# Patient Record
Sex: MALE | Race: BLACK OR AFRICAN AMERICAN | Employment: FULL TIME | URBAN - METROPOLITAN AREA
[De-identification: names, ages, dates, MRNs, and addresses within clinical notes are randomized per-mention and may not be internally consistent; named-entity substitution may affect disease eponyms.]

---

## 2017-04-30 DIAGNOSIS — M10.9 GOUT: ICD-10-CM

## 2017-05-01 RX ORDER — ALLOPURINOL 100 MG/1
TABLET ORAL
Qty: 60 TAB | Refills: 0 | Status: SHIPPED | OUTPATIENT
Start: 2017-05-01 | End: 2017-06-17 | Stop reason: SDUPTHER

## 2017-05-17 ENCOUNTER — OFFICE VISIT (OUTPATIENT)
Dept: INTERNAL MEDICINE CLINIC | Age: 42
End: 2017-05-17

## 2017-05-17 VITALS
BODY MASS INDEX: 39.88 KG/M2 | RESPIRATION RATE: 20 BRPM | OXYGEN SATURATION: 95 % | SYSTOLIC BLOOD PRESSURE: 133 MMHG | HEIGHT: 70 IN | WEIGHT: 278.6 LBS | HEART RATE: 74 BPM | TEMPERATURE: 98.3 F | DIASTOLIC BLOOD PRESSURE: 83 MMHG

## 2017-05-17 DIAGNOSIS — M1A.9XX0 CHRONIC GOUT INVOLVING TOE OF RIGHT FOOT WITHOUT TOPHUS, UNSPECIFIED CAUSE: ICD-10-CM

## 2017-05-17 DIAGNOSIS — R30.0 DYSURIA: Primary | ICD-10-CM

## 2017-05-17 DIAGNOSIS — R82.90 ABNORMAL FINDING IN URINE: ICD-10-CM

## 2017-05-17 DIAGNOSIS — R35.0 URINARY FREQUENCY: ICD-10-CM

## 2017-05-17 DIAGNOSIS — E78.00 PURE HYPERCHOLESTEROLEMIA: ICD-10-CM

## 2017-05-17 LAB
BILIRUB UR QL STRIP: NEGATIVE
GLUCOSE UR-MCNC: NEGATIVE MG/DL
HBA1C MFR BLD HPLC: 5.7 % (ref 4.8–5.6)
KETONES P FAST UR STRIP-MCNC: NEGATIVE MG/DL
PH UR STRIP: 6.5 [PH] (ref 4.6–8)
PROT UR QL STRIP: NEGATIVE MG/DL
SP GR UR STRIP: 1.01 (ref 1–1.03)
UA UROBILINOGEN AMB POC: NORMAL (ref 0.2–1)
URINALYSIS CLARITY POC: CLEAR
URINALYSIS COLOR POC: YELLOW
URINE BLOOD POC: NORMAL
URINE LEUKOCYTES POC: NORMAL
URINE NITRITES POC: NEGATIVE

## 2017-05-17 RX ORDER — CIPROFLOXACIN 500 MG/1
500 TABLET ORAL 2 TIMES DAILY
Qty: 28 TAB | Refills: 0 | Status: SHIPPED | OUTPATIENT
Start: 2017-05-17 | End: 2017-05-31

## 2017-05-17 RX ORDER — LOVASTATIN 20 MG/1
20 TABLET ORAL
Qty: 90 TAB | Refills: 3 | Status: SHIPPED | OUTPATIENT
Start: 2017-05-17 | End: 2017-10-24

## 2017-05-17 NOTE — PROGRESS NOTES
RM#7\  Chief Complaint   Patient presents with    Urinary Burning     x 2 weeks     Results for orders placed or performed in visit on 05/17/17   AMB POC URINALYSIS DIP STICK AUTO W/O MICRO   Result Value Ref Range    Color (UA POC) Yellow     Clarity (UA POC) Clear     Glucose (UA POC) Negative Negative    Bilirubin (UA POC) Negative Negative    Ketones (UA POC) Negative Negative    Specific gravity (UA POC) 1.015 1.001 - 1.035    Blood (UA POC) Trace Negative    pH (UA POC) 6.5 4.6 - 8.0    Protein (UA POC) Negative Negative mg/dL    Urobilinogen (UA POC) 0.2 mg/dL 0.2 - 1    Nitrites (UA POC) Negative Negative    Leukocyte esterase (UA POC) 1+ Negative     Results for orders placed or performed in visit on 05/17/17   AMB POC URINALYSIS DIP STICK AUTO W/O MICRO   Result Value Ref Range    Color (UA POC) Yellow     Clarity (UA POC) Clear     Glucose (UA POC) Negative Negative    Bilirubin (UA POC) Negative Negative    Ketones (UA POC) Negative Negative    Specific gravity (UA POC) 1.015 1.001 - 1.035    Blood (UA POC) Trace Negative    pH (UA POC) 6.5 4.6 - 8.0    Protein (UA POC) Negative Negative mg/dL    Urobilinogen (UA POC) 0.2 mg/dL 0.2 - 1    Nitrites (UA POC) Negative Negative    Leukocyte esterase (UA POC) 1+ Negative   AMB POC HEMOGLOBIN A1C   Result Value Ref Range    Hemoglobin A1c (POC) 5.7 (A) 4.8 - 5.6 %         1. Have you been to the ER, urgent care clinic since your last visit? Hospitalized since your last visit? Yes    2. Have you seen or consulted any other health care providers outside of the [facility] since your last visit? Include any pap smears or colon screening.  Yes, [facility] ER six months ago for chest pains

## 2017-05-17 NOTE — MR AVS SNAPSHOT
Visit Information Date & Time Provider Department Dept. Phone Encounter #  
 5/17/2017  9:00 AM Rachel Lucianoa [phone] Pediatrics and Internal Medicine [phone] [id number] Follow-up Instructions Return in about 2 months (around 7/17/2017) for fasting labs. Upcoming Health Maintenance Date Due DTaP/Tdap/Td series (1 - Tdap) 4/23/1994 INFLUENZA AGE 9 TO ADULT 8/1/2017 Allergies as of 5/17/2017  Review Complete On: 5/17/2017 By: Saul Salazar NP No Known Allergies Current Immunizations  Never Reviewed No immunizations on file. Not reviewed this visit You Were Diagnosed With   
  
 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 9XX0 
ICD-9-CM: 274.02 Abnormal finding in urine     ICD-10-CM: R82.90 ICD-9-CM: 791.9 Vitals BP Pulse Temp Resp Height(growth percentile) Weight(growth percentile) 133/83 (BP 1 Location: Right arm, BP Patient Position: Sitting) 74 98.3 °F (36.8 °C) (Oral) 20 5' 10\" (1.778 m) 278 lb 9.6 oz (126.4 kg) SpO2 BMI Smoking Status 95% 39.97 kg/m2 Never Smoker BMI and BSA Data Body Mass Index Body Surface Area  
 39.97 kg/m 2 2.5 m 2 Preferred Pharmacy Pharmacy Name Phone [facility] [address] [place] [address] 78 Your Updated Medication List  
  
   
This list is accurate as of: 5/17/17  9:33 AM.  Always use your most recent med list.  
  
  
  
  
 allopurinol 100 mg tablet Commonly known as:  ZYLOPRIM  
TAKE TWO TABLETS BY MOUTH ONCE DAILY  
  
 ciprofloxacin HCl 500 mg tablet Commonly known as:  CIPRO Take 1 Tab by mouth two (2) times a day for 14 days. lovastatin 20 mg tablet Commonly known as:  MEVACOR Take 1 Tab by mouth nightly. Prescriptions Sent to Pharmacy Refills  
 lovastatin (MEVACOR) 20 mg tablet 3 Sig: Take 1 Tab by mouth nightly. Class: Normal  
 Pharmacy: [facility] [address], [place] [address] Ph #: [phone] Route: Oral  
 ciprofloxacin HCl (CIPRO) 500 mg tablet 0 Sig: Take 1 Tab by mouth two (2) times a day for 14 days. Class: Normal  
 Pharmacy: [facility] [address], [place] [address] Ph #: [phone] Route: Oral  
  
We Performed the Following AMB POC HEMOGLOBIN A1C [03596 CPT(R)] AMB POC URINALYSIS DIP STICK AUTO W/O MICRO [41011 CPT(R)] Chris Lanes / GC-AMPLIFIED [[id number] Custom] CULTURE, URINE B0413447 CPT(R)] Follow-up Instructions Return in about 2 months (around 7/17/2017) for fasting labs. Introducing [facility] & [facility]! Rebekah Corado introduces Backupify patient portal. Now you can access parts of your medical record, email your doctor's office, and request medication refills online. 1. In your internet browser, go to [email or web address] 2. Click on the First Time User? Click Here link in the Sign In box. You will see the New Member Sign Up page. 3. Enter your Backupify Access Code exactly as it appears below. You will not need to use this code after youve completed the sign-up process. If you do not sign up before the expiration date, you must request a new code. · Backupify Access Code: [email or web address] Expires: 8/15/2017  9:33 AM 
 
4. Enter the last four digits of your Social Security Number (xxxx) and Date of Birth (mm/dd/yyyy) as indicated and click Submit. You will be taken to the next sign-up page. 5. Create a Backupify ID. This will be your Backupify login ID and cannot be changed, so think of one that is secure and easy to remember. 6. Create a RedLasso password. You can change your password at any time. 7. Enter your Password Reset Question and Answer. This can be used at a later time if you forget your password. 8. Enter your e-mail address. You will receive e-mail notification when new information is available in [address] 9. Click Sign Up. You can now view and download portions of your medical record. 10. Click the Download Summary menu link to download a portable copy of your medical information. If you have questions, please visit the Frequently Asked Questions section of the RedLasso website. Remember, RedLasso is NOT to be used for urgent needs. For medical emergencies, dial 911. Now available from your iPhone and Android! Please provide this summary of care documentation to your next provider. Your primary care clinician is listed as Farhad Youngblood. If you have any questions after today's visit, please call [phone].

## 2017-05-19 LAB
BACTERIA UR CULT: NO GROWTH
C TRACH RRNA SPEC QL NAA+PROBE: NEGATIVE
N GONORRHOEA RRNA SPEC QL NAA+PROBE: NEGATIVE

## 2017-05-22 NOTE — PROGRESS NOTES
Spoke to patient, verified patient name and . Advised normal results of labs. Patient voiced understanding and did not have any further questions at this time.

## 2017-05-22 NOTE — PROGRESS NOTES
HISTORY OF PRESENT ILLNESS  Cristina Madsen is a 38 y.o. male. HPI  Urinary frequency and dysuria for 2 weeks. Bilateral groin itching. New partner. Condom broke . Discolored penile discharge. Forgot about cholesterol medication; requests refill    Compliant with gout medication. Having mild right great toe pain c/w gout flare    Past Medical History:   Diagnosis Date    Hypertension        Current Outpatient Prescriptions on File Prior to Visit   Medication Sig Dispense Refill    allopurinol (ZYLOPRIM) 100 mg tablet TAKE TWO TABLETS BY MOUTH ONCE DAILY 60 Tab 0     No current facility-administered medications on file prior to visit. ROS  Negative except noted in HPI    Physical Exam  Well appearing, NAD  Respirator:  lungs CTA  Cardiac. Normal rate and rhythm  : no rash or lesion, no adenopathy or  penile discharge   Extremities: no deformity, edema or tenderness    ASSESSMENT and PLAN    ICD-10-CM ICD-9-CM    1. Dysuria R30.0 788.1 AMB POC URINALYSIS DIP STICK AUTO W/O MICRO      CHLAMYDIA / GC-AMPLIFIED      CULTURE, URINE      ciprofloxacin HCl (CIPRO) 500 mg tablet   2. Urinary frequency R35.0 788.41 AMB POC URINALYSIS DIP STICK AUTO W/O MICRO      CULTURE, URINE      AMB POC HEMOGLOBIN A1C      CHLAMYDIA/GC AMPLIFICATON THINPREP   3. Pure hypercholesterolemia E78.00 272.0 lovastatin (MEVACOR) 20 mg tablet   4. Chronic gout involving toe of right foot without tophus, unspecified cause M1A. 9XX0 274.02    5. Abnormal finding in urine R82.90 791.9 CULTURE, URINE     Follow-up Disposition:  Return in about 2 months (around 7/17/2017) for fasting labs.   lab results and schedule of future lab studies reviewed with patient  reviewed diet, exercise and weight control  reviewed medications and side effects in detail    Strongly recommended urology evaluation, safe sex    Restart cholesterol lowering medication, low cholesterol diet, regular exercise, weight loss

## 2017-06-17 DIAGNOSIS — M10.9 GOUT: ICD-10-CM

## 2017-06-18 RX ORDER — ALLOPURINOL 100 MG/1
TABLET ORAL
Qty: 60 TAB | Refills: 0 | Status: SHIPPED | OUTPATIENT
Start: 2017-06-18 | End: 2017-06-19 | Stop reason: SDUPTHER

## 2017-06-19 NOTE — TELEPHONE ENCOUNTER
Request was already received and sent to [facility] but we received another request from [address] [facility] seen Wednesday, May 17, 2017

## 2017-06-21 RX ORDER — ALLOPURINOL 100 MG/1
TABLET ORAL
Qty: 60 TAB | Refills: 0 | Status: SHIPPED | OUTPATIENT
Start: 2017-06-21 | End: 2018-06-24 | Stop reason: SDUPTHER

## 2017-10-23 ENCOUNTER — OFFICE VISIT (OUTPATIENT)
Dept: INTERNAL MEDICINE CLINIC | Age: 42
End: 2017-10-23

## 2017-10-23 VITALS
HEART RATE: 85 BPM | TEMPERATURE: 98.4 F | HEIGHT: 70 IN | DIASTOLIC BLOOD PRESSURE: 80 MMHG | RESPIRATION RATE: 18 BRPM | OXYGEN SATURATION: 95 % | SYSTOLIC BLOOD PRESSURE: 121 MMHG | BODY MASS INDEX: 38.25 KG/M2 | WEIGHT: 267.2 LBS

## 2017-10-23 DIAGNOSIS — R30.0 DYSURIA: Primary | ICD-10-CM

## 2017-10-23 DIAGNOSIS — E78.00 PURE HYPERCHOLESTEROLEMIA: ICD-10-CM

## 2017-10-23 DIAGNOSIS — E29.1 HYPOGONADISM MALE: ICD-10-CM

## 2017-10-23 DIAGNOSIS — R73.01 IFG (IMPAIRED FASTING GLUCOSE): ICD-10-CM

## 2017-10-23 LAB
BILIRUB UR QL STRIP: NEGATIVE
GLUCOSE UR-MCNC: NEGATIVE MG/DL
KETONES P FAST UR STRIP-MCNC: NEGATIVE MG/DL
PH UR STRIP: 6.5 [PH] (ref 4.6–8)
PROT UR QL STRIP: NEGATIVE MG/DL
SP GR UR STRIP: 1.02 (ref 1–1.03)
UA UROBILINOGEN AMB POC: NORMAL (ref 0.2–1)
URINALYSIS CLARITY POC: CLEAR
URINALYSIS COLOR POC: YELLOW
URINE BLOOD POC: NORMAL
URINE LEUKOCYTES POC: NORMAL
URINE NITRITES POC: NEGATIVE

## 2017-10-23 NOTE — PROGRESS NOTES
RM#9  Chief Complaint   Patient presents with    Follow-up     f/u for dysuria pt states may have UTI     Results for orders placed or performed in visit on 10/23/17   AMB POC URINALYSIS DIP STICK AUTO W/O MICRO   Result Value Ref Range    Color (UA POC) Yellow     Clarity (UA POC) Clear     Glucose (UA POC) Negative Negative    Bilirubin (UA POC) Negative Negative    Ketones (UA POC) Negative Negative    Specific gravity (UA POC) 1.020 1.001 - 1.035    Blood (UA POC) 1+ Negative    pH (UA POC) 6.5 4.6 - 8.0    Protein (UA POC) Negative Negative mg/dL    Urobilinogen (UA POC) 0.2 mg/dL 0.2 - 1    Nitrites (UA POC) Negative Negative    Leukocyte esterase (UA POC) Trace Negative       1. Have you been to the ER, urgent care clinic since your last visit? Hospitalized since your last visit? No    2. Have you seen or consulted any other health care providers outside of the [address] [place] [address] since your last visit? Include any pap smears or colon screening.  No  Health Maintenance Due   Topic Date Due    DTaP/Tdap/Td  (1 - Tdap) 04/23/1994    Flu Vaccine  08/01/2017

## 2017-10-23 NOTE — MR AVS SNAPSHOT
Visit Information Date & Time Provider Department Dept. Phone Encounter #  
 10/23/2017  2:15 PM Louann Ahn, [address] [place] [address] and Internal Medicine [phone] [id number] Follow-up Instructions Return for lab only visit. Upcoming Health Maintenance Date Due DTaP/Tdap/Td series (1 - Tdap) 4/23/1994 INFLUENZA AGE 9 TO ADULT 8/1/2017 Allergies as of 10/23/2017  Review Complete On: 10/23/2017 By: Berto Cannon No Known Allergies Current Immunizations  Never Reviewed No immunizations on file. Not reviewed this visit You Were Diagnosed With   
  
 Codes Comments Dysuria    -  Primary ICD-10-CM: R30.0 ICD-9-CM: 788.1 Hypogonadism male     ICD-10-CM: E29.1 ICD-9-CM: 257.2 Pure hypercholesterolemia     ICD-10-CM: E78.00 ICD-9-CM: 272.0 IFG (impaired fasting glucose)     ICD-10-CM: R73.01 
ICD-9-CM: 790.21 Vitals BP Pulse Temp Resp Height(growth percentile) Weight(growth percentile) 121/80 (BP 1 Location: Right arm, BP Patient Position: Sitting) 85 98.4 °F (36.9 °C) (Oral) 18 5' 10\" (1.778 m) 267 lb 3.2 oz (121.2 kg) SpO2 BMI Smoking Status 95% 38.34 kg/m2 Never Smoker BMI and BSA Data Body Mass Index Body Surface Area  
 38.34 kg/m 2 2.45 m 2 Preferred Pharmacy Pharmacy Name Phone [facility] [address] [facility] 78 Your Updated Medication List  
  
   
This list is accurate as of: 10/23/17  2:55 PM.  Always use your most recent med list.  
  
  
  
  
 allopurinol 100 mg tablet Commonly known as:  ZYLOPRIM  
TAKE TWO TABLETS BY MOUTH ONCE DAILY lovastatin 20 mg tablet Commonly known as:  MEVACOR Take 1 Tab by mouth nightly. We Performed the Following AMB POC URINALYSIS DIP STICK AUTO W/O MICRO [86160 CPT(R)] CBC WITH AUTOMATED DIFF [37982 CPT(R)] CT/NG/T.VAGINALIS AMPLIFICATION [id number] CPT(R)] HEMOGLOBIN A1C WITH EAG [66944 CPT(R)] LIPID PANEL [46661 CPT(R)] METABOLIC PANEL, COMPREHENSIVE [80837 CPT(R)] PSA W/ REFLX FREE PSA [11854 CPT(R)] REFERRAL TO UROLOGY [[id number] Custom] Comments: Hypogonadism, recurrent dysuria Follow-up Instructions Return for lab only visit. To-Do List   
 10/23/2017 Lab:  TESTOSTERONE, FREE & TOTAL Referral Information Referral ID Referred By Referred To  
  
 [id number] [address], [address] Phone: [phone] Fax: [phone] Visits Status Start Date End Date 1 New Request 10/23/17 10/23/18 If your referral has a status of pending review or denied, additional information will be sent to support the outcome of this decision. Patient Instructions High Cholesterol: Care Instructions Your Care Instructions Cholesterol is a type of fat in your blood. It is needed for many body functions, such as making new cells. Cholesterol is made by your body. It also comes from food you eat. High cholesterol means that you have too much of the fat in your blood. This raises your risk of a heart attack and stroke. LDL and HDL are part of your total cholesterol. LDL is the \"bad\" cholesterol. High LDL can raise your risk for heart disease, heart attack, and stroke. HDL is the \"good\" cholesterol. It helps clear bad cholesterol from the body. High HDL is linked with a lower risk of heart disease, heart attack, and stroke. Your cholesterol levels help your doctor find out your risk for having a heart attack or stroke. You and your doctor can talk about whether you need to lower your risk and what treatment is best for you. A heart-healthy lifestyle along with medicines can help lower your cholesterol and your risk.  The way you choose to lower your risk will depend on how high your risk is for heart attack and stroke. It will also depend on how you feel about taking medicines. Follow-up care is a key part of your treatment and safety. Be sure to make and go to all appointments, and call your doctor if you are having problems. It's also a good idea to know your test results and keep a list of the medicines you take. How can you care for yourself at home? · Eat a variety of foods every day. Good choices include fruits, vegetables, whole grains (like oatmeal), dried beans and peas, nuts and seeds, soy products (like tofu), and fat-free or low-fat dairy products. · Replace butter, margarine, and hydrogenated or partially hydrogenated oils with olive and canola oils. (Canola oil margarine without trans fat is fine.) · Replace red meat with fish, poultry, and soy protein (like tofu). · Limit processed and packaged foods like chips, crackers, and cookies. · Bake, broil, or steam foods. Don't whitaker them. · Be physically active. Get at least 30 minutes of exercise on most days of the week. Walking is a good choice. You also may want to do other activities, such as running, swimming, cycling, or playing tennis or team sports. · Stay at a healthy weight or lose weight by making the changes in eating and physical activity listed above. Losing just a small amount of weight, even 5 to 10 pounds, can reduce your risk for having a heart attack or stroke. · Do not smoke. When should you call for help? Watch closely for changes in your health, and be sure to contact your doctor if: 
· You need help making lifestyle changes. · You have questions about your medicine. Where can you learn more? Go to http://sammie-vanessa.info/. Enter K838 in the search box to learn more about \"High Cholesterol: Care Instructions. \" Current as of: April 3, 2017 Content Version: 11.3 © 5052-9602 [employer].  Care instructions adapted under license by [address] (which disclaims liability or warranty for this information). If you have questions about a medical condition or this instruction, always ask your healthcare professional. [address] any warranty or liability for your use of this information. Introducing [facility]! Christel Ortega introduces UnFlete.com patient portal. Now you can access parts of your medical record, email your doctor's office, and request medication refills online. 1. In your internet browser, go to [email or web address] 2. Click on the First Time User? Click Here link in the Sign In box. You will see the New Member Sign Up page. 3. Enter your UnFlete.com Access Code exactly as it appears below. You will not need to use this code after youve completed the sign-up process. If you do not sign up before the expiration date, you must request a new code. · [email or web address] Access Code: [email or web address] Expires: 1/21/2018  2:55 PM 
 
4. Enter the last four digits of your Social Security Number (xxxx) and Date of Birth (mm/dd/yyyy) as indicated and click Submit. You will be taken to the next sign-up page. 5. Create a [email or web address] ID. This will be your UnFlete.com login ID and cannot be changed, so think of one that is secure and easy to remember. 6. Create a UnFlete.com password. You can change your password at any time. 7. Enter your Password Reset Question and Answer. This can be used at a later time if you forget your password. 8. Enter your e-mail address. You will receive e-mail notification when new information is available in [address] 9. Click Sign Up. You can now view and download portions of your medical record. 10. Click the Download Summary menu link to download a portable copy of your medical information. If you have questions, please visit the Frequently Asked Questions section of the [email or web address] website.  Remember, UnFlete.com is NOT to be used for urgent needs. For medical emergencies, dial 911. Now available from your iPhone and Android! Please provide this summary of care documentation to your next provider. Your primary care clinician is listed as Fallon Ramos. If you have any questions after today's visit, please call [phone].

## 2017-10-23 NOTE — PATIENT INSTRUCTIONS

## 2017-10-24 LAB
ALBUMIN SERPL-MCNC: 4.6 G/DL (ref 3.5–5.5)
ALBUMIN/GLOB SERPL: 1.7 {RATIO} (ref 1.2–2.2)
ALP SERPL-CCNC: 64 IU/L (ref 39–117)
ALT SERPL-CCNC: 26 IU/L (ref 0–44)
AST SERPL-CCNC: 22 IU/L (ref 0–40)
BASOPHILS # BLD AUTO: 0 X10E3/UL (ref 0–0.2)
BASOPHILS NFR BLD AUTO: 0 %
BILIRUB SERPL-MCNC: 0.7 MG/DL (ref 0–1.2)
BUN SERPL-MCNC: 12 MG/DL (ref 6–24)
BUN/CREAT SERPL: 10 (ref 9–20)
CALCIUM SERPL-MCNC: 9.3 MG/DL (ref 8.7–10.2)
CHLORIDE SERPL-SCNC: 98 MMOL/L (ref 96–106)
CHOLEST SERPL-MCNC: 222 MG/DL (ref 100–199)
CO2 SERPL-SCNC: 27 MMOL/L (ref 18–29)
CREAT SERPL-MCNC: 1.19 MG/DL (ref 0.76–1.27)
EOSINOPHIL # BLD AUTO: 0.1 X10E3/UL (ref 0–0.4)
EOSINOPHIL NFR BLD AUTO: 2 %
ERYTHROCYTE [DISTWIDTH] IN BLOOD BY AUTOMATED COUNT: 13.5 % (ref 12.3–15.4)
EST. AVERAGE GLUCOSE BLD GHB EST-MCNC: 111 MG/DL
GLOBULIN SER CALC-MCNC: 2.7 G/DL (ref 1.5–4.5)
GLUCOSE SERPL-MCNC: 91 MG/DL (ref 65–99)
HBA1C MFR BLD: 5.5 % (ref 4.8–5.6)
HCT VFR BLD AUTO: 50.6 % (ref 37.5–51)
HDLC SERPL-MCNC: 44 MG/DL
HGB BLD-MCNC: 17.2 G/DL (ref 12.6–17.7)
IMM GRANULOCYTES # BLD: 0 X10E3/UL (ref 0–0.1)
IMM GRANULOCYTES NFR BLD: 0 %
LDLC SERPL CALC-MCNC: 148 MG/DL (ref 0–99)
LYMPHOCYTES # BLD AUTO: 2.2 X10E3/UL (ref 0.7–3.1)
LYMPHOCYTES NFR BLD AUTO: 32 %
MCH RBC QN AUTO: 30.7 PG (ref 26.6–33)
MCHC RBC AUTO-ENTMCNC: 34 G/DL (ref 31.5–35.7)
MCV RBC AUTO: 90 FL (ref 79–97)
MONOCYTES # BLD AUTO: 0.6 X10E3/UL (ref 0.1–0.9)
MONOCYTES NFR BLD AUTO: 9 %
NEUTROPHILS # BLD AUTO: 3.9 X10E3/UL (ref 1.4–7)
NEUTROPHILS NFR BLD AUTO: 57 %
PLATELET # BLD AUTO: 279 X10E3/UL (ref 150–379)
POTASSIUM SERPL-SCNC: 4.5 MMOL/L (ref 3.5–5.2)
PROT SERPL-MCNC: 7.3 G/DL (ref 6–8.5)
PSA SERPL-MCNC: 0.6 NG/ML (ref 0–4)
RBC # BLD AUTO: 5.61 X10E6/UL (ref 4.14–5.8)
REFLEX CRITERIA: NORMAL
SODIUM SERPL-SCNC: 141 MMOL/L (ref 134–144)
TRIGL SERPL-MCNC: 148 MG/DL (ref 0–149)
VLDLC SERPL CALC-MCNC: 30 MG/DL (ref 5–40)
WBC # BLD AUTO: 7 X10E3/UL (ref 3.4–10.8)

## 2017-10-24 NOTE — PROGRESS NOTES
HISTORY OF PRESENT ILLNESS  Jay Hernandez is a 38 y.o. male presents for acute visit  HPI  His 40year old spouse  several months ago with breast cancer. He reports recurrence of dysuria. Similar episodes in past with negative STI screening. Did not schedule appointment with urologist    He is not taking cholesterol medication. Prefers to managed high cholesterol with diet and exercise modifications    He has lost weight with regular exercise     No recent gout flare    He now has health insurance and wishes to be treated for low testosterone level. He has had problems with ED    Past Medical History:   Diagnosis Date    Hypertension        Current Outpatient Prescriptions on File Prior to Visit   Medication Sig Dispense Refill    allopurinol (ZYLOPRIM) 100 mg tablet TAKE TWO TABLETS BY MOUTH ONCE DAILY 60 Tab 0     No current facility-administered medications on file prior to visit. Review of Systems   Constitutional: Positive for weight loss (intentional). HENT: Negative. Eyes: Negative. Respiratory: Negative. Cardiovascular: Negative. Gastrointestinal: Negative. Genitourinary: Positive for dysuria. Musculoskeletal: Negative. Neurological: Negative. Psychiatric/Behavioral: Negative. Physical Exam   Constitutional: He is oriented to person, place, and time. He appears well-developed and well-nourished. No distress. Cardiovascular: Normal rate, regular rhythm and normal heart sounds. Pulmonary/Chest: Effort normal and breath sounds normal.   Musculoskeletal: He exhibits no edema or tenderness. Neurological: He is alert and oriented to person, place, and time. Skin: He is not diaphoretic. ASSESSMENT and PLAN    ICD-10-CM ICD-9-CM    1.  Dysuria R30.0 788.1 AMB POC URINALYSIS DIP STICK AUTO W/O MICRO      METABOLIC PANEL, COMPREHENSIVE      CBC WITH AUTOMATED DIFF      CT/NG/T.VAGINALIS AMPLIFICATION      REFERRAL TO UROLOGY      URINALYSIS W/ RFLX MICROSCOPIC   2. Hypogonadism male E29.1 257.2 PSA W/ REFLX FREE PSA      TESTOSTERONE, FREE & TOTAL   3. Pure hypercholesterolemia E78.00 272.0 LIPID PANEL      METABOLIC PANEL, COMPREHENSIVE   4. IFG (impaired fasting glucose) R73.01 790.21 HEMOGLOBIN A1C WITH EAG     Follow-up Disposition:  Return for lab only visit. current treatment plan is effective, no change in therapy  lab results and schedule of future lab studies reviewed with patient  reviewed diet, exercise and weight control  cardiovascular risk and specific lipid/LDL goals reviewed  reviewed medications and side effects in detail  use of aspirin to prevent MI and TIA's discussed    1. Recurrent, strongly encouraged to see urologist    2. Last  testosterone level > 3 years, will need to repeat before treatment.  Will defer any management to urologist in light of other  concerns

## 2017-10-25 ENCOUNTER — TELEPHONE (OUTPATIENT)
Dept: INTERNAL MEDICINE CLINIC | Age: 42
End: 2017-10-25

## 2017-10-25 LAB
APPEARANCE UR: ABNORMAL
BACTERIA #/AREA URNS HPF: ABNORMAL /[HPF]
BILIRUB UR QL STRIP: NEGATIVE
C TRACH RRNA SPEC QL NAA+PROBE: NEGATIVE
CASTS URNS QL MICRO: ABNORMAL /LPF
COLOR UR: YELLOW
EPI CELLS #/AREA URNS HPF: >10 /HPF
GLUCOSE UR QL: NEGATIVE
HGB UR QL STRIP: NEGATIVE
KETONES UR QL STRIP: NEGATIVE
LEUKOCYTE ESTERASE UR QL STRIP: ABNORMAL
MICRO URNS: ABNORMAL
MUCOUS THREADS URNS QL MICRO: PRESENT
N GONORRHOEA RRNA SPEC QL NAA+PROBE: NEGATIVE
NITRITE UR QL STRIP: NEGATIVE
PH UR STRIP: 6.5 [PH] (ref 5–7.5)
PROT UR QL STRIP: NEGATIVE
RBC #/AREA URNS HPF: ABNORMAL /HPF
SP GR UR: 1.02 (ref 1–1.03)
T VAGINALIS RRNA SPEC QL NAA+PROBE: POSITIVE
UROBILINOGEN UR STRIP-MCNC: 0.2 MG/DL (ref 0.2–1)
WBC #/AREA URNS HPF: ABNORMAL /HPF

## 2017-10-25 RX ORDER — CIPROFLOXACIN 500 MG/1
500 TABLET ORAL 2 TIMES DAILY
Qty: 20 TAB | Refills: 0 | Status: SHIPPED | OUTPATIENT
Start: 2017-10-25 | End: 2017-11-04

## 2017-10-25 NOTE — TELEPHONE ENCOUNTER
Spoke with patient after verifying name and  regarding lab results. Patient advised test results from are not finalized/reviewed patient will be called when lab results have been received and reviewed by provider. Patient stated he can't wait for the labs to come back to get an antibiotic or whatever medication Ms Wm Rae is going to give. Patient given an opportunity to ask questions, repeated information, and verbalized understanding.

## 2017-10-26 RX ORDER — METRONIDAZOLE 500 MG/1
2000 TABLET ORAL ONCE
Qty: 4 TAB | Refills: 0 | Status: SHIPPED | OUTPATIENT
Start: 2017-10-26 | End: 2017-10-26

## 2017-10-26 NOTE — TELEPHONE ENCOUNTER
Please advise lab result shows trichomonas infection. Antibiotic sent to pharmacy.  Partner needs to be treated

## 2017-10-30 NOTE — TELEPHONE ENCOUNTER
Pt request a call back from provider nurse to discuss results. Also, per pt, he thinks his kids threw away his antibiotic and needs something else called in.   # [phone]

## 2018-02-12 ENCOUNTER — OFFICE VISIT (OUTPATIENT)
Dept: INTERNAL MEDICINE CLINIC | Age: 43
End: 2018-02-12

## 2018-02-12 VITALS
OXYGEN SATURATION: 98 % | BODY MASS INDEX: 36.31 KG/M2 | SYSTOLIC BLOOD PRESSURE: 126 MMHG | HEIGHT: 70 IN | HEART RATE: 66 BPM | DIASTOLIC BLOOD PRESSURE: 74 MMHG | WEIGHT: 253.6 LBS | TEMPERATURE: 98.2 F | RESPIRATION RATE: 18 BRPM

## 2018-02-12 DIAGNOSIS — R82.998 LEUKOCYTES IN URINE: ICD-10-CM

## 2018-02-12 DIAGNOSIS — Z86.19 HISTORY OF TRICHOMONAL URETHRITIS: ICD-10-CM

## 2018-02-12 DIAGNOSIS — N34.2 URETHRITIS: Primary | ICD-10-CM

## 2018-02-12 DIAGNOSIS — R31.9 HEMATURIA, UNSPECIFIED TYPE: ICD-10-CM

## 2018-02-12 DIAGNOSIS — R30.0 DYSURIA: ICD-10-CM

## 2018-02-12 LAB
BILIRUB UR QL STRIP: NEGATIVE
GLUCOSE UR-MCNC: NEGATIVE MG/DL
KETONES P FAST UR STRIP-MCNC: NEGATIVE MG/DL
PH UR STRIP: 5.5 [PH] (ref 4.6–8)
PROT UR QL STRIP: NEGATIVE
SP GR UR STRIP: 1.02 (ref 1–1.03)
UA UROBILINOGEN AMB POC: NORMAL (ref 0.2–1)
URINALYSIS CLARITY POC: NORMAL
URINALYSIS COLOR POC: YELLOW
URINE BLOOD POC: NORMAL
URINE LEUKOCYTES POC: NORMAL
URINE NITRITES POC: NEGATIVE

## 2018-02-12 RX ORDER — METRONIDAZOLE 500 MG/1
2000 TABLET ORAL ONCE
Qty: 4 TAB | Refills: 0 | Status: SHIPPED | OUTPATIENT
Start: 2018-02-12 | End: 2018-02-12

## 2018-02-12 NOTE — MR AVS SNAPSHOT
[address] [facility] [place] [address] 
[phone] Patient: Shiraz Lira MRN: [id number] TOT:4/73/1179 Visit Information Date & Time Provider Department Dept. Phone Encounter #  
 2/12/2018 11:15 AM Jackson Lamb MD [address] [facility] [place] [phone] [id number] Follow-up Instructions Return if symptoms worsen or fail to improve. Upcoming Health Maintenance Date Due DTaP/Tdap/Td series (1 - Tdap) 4/23/1994 Influenza Age 5 to Adult 8/1/2017 Allergies as of 2/12/2018  Review Complete On: 2/12/2018 By: Floridalma Sal LPN No Known Allergies Current Immunizations  Never Reviewed No immunizations on file. Not reviewed this visit You Were Diagnosed With   
  
 Codes Comments Dysuria    -  Primary ICD-10-CM: R30.0 ICD-9-CM: 788.1 History of trichomonal urethritis     ICD-10-CM: Z86.19 ICD-9-CM: V13.02 Hematuria, unspecified type     ICD-10-CM: R31.9 ICD-9-CM: 599.70 Leukocytes in urine     ICD-10-CM: R82.99 
ICD-9-CM: 791.7 Vitals BP Pulse Temp Resp Height(growth percentile) Weight(growth percentile) 126/74 (BP 1 Location: Right arm, BP Patient Position: Sitting) 66 98.2 °F (36.8 °C) (Oral) 18 5' 10\" (1.778 m) 253 lb 9.6 oz (115 kg) SpO2 BMI Smoking Status 98% 36.39 kg/m2 Never Smoker BMI and BSA Data Body Mass Index Body Surface Area  
 36.39 kg/m 2 2.38 m 2 Preferred Pharmacy Pharmacy Name Phone [facility]/PHARMACY #[id number]- [facility] [address] [facility] AT [address] [place] [address] [phone] Your Updated Medication List  
  
   
This list is accurate as of: 2/12/18 12:05 PM.  Always use your most recent med list.  
  
  
  
  
 allopurinol 100 mg tablet Commonly known as:  ZYLOPRIM  
TAKE TWO TABLETS BY MOUTH ONCE DAILY  
  
 metroNIDAZOLE 500 mg tablet Commonly known as:  FLAGYL Take 4 Tabs by mouth once for 1 dose. Prescriptions Sent to Pharmacy Refills  
 metroNIDAZOLE (FLAGYL) 500 mg tablet 0 Sig: Take 4 Tabs by mouth once for 1 dose. Class: Normal  
 Pharmacy: [place], [address]  #: [phone] Route: Oral  
  
We Performed the Following AMB POC URINALYSIS DIP STICK AUTO W/O MICRO [76990 CPT(R)] CT/NG/T.VAGINALIS AMPLIFICATION [id number] CPT(R)] CULTURE, URINE [id number] CPT(R)] URINALYSIS W/ RFLX MICROSCOPIC [97638 CPT(R)] Follow-up Instructions Return if symptoms worsen or fail to improve. Patient Instructions Trichomoniasis: Care Instructions Your Care Instructions Trichomoniasis is a sexually transmitted infection (STI) that is spread by having sex with an infected partner. Trichomoniasis is commonly called trich (say \"trick\"). In women, trich may cause vaginal itching and a smelly discharge. But in many cases, especially in men, there are no symptoms. Aaliyah Daniels is treated so that you do not spread it to others. Both you and your sex partner or partners should be treated at the same time so you do not infect each other again. Trich may cause problems with pregnancy. Your doctor will talk with you about treatment for Trich if you are pregnant. Follow-up care is a key part of your treatment and safety. Be sure to make and go to all appointments, and call your doctor if you are having problems. It's also a good idea to know your test results and keep a list of the medicines you take. How can you care for yourself at home? · Take your antibiotics as directed. Do not stop taking them just because you feel better. You need to take the full course of antibiotics. · Do not have sex while you are being treated. If your doctor gave you a single dose of antibiotics, do not have sex for one week after being treated and until your partner also has been treated. · Tell your sex partner (or partners) that he or she will also need to be tested and treated. · Use a cold water compress or cool baths to relieve itching. To prevent trichomoniasis in the future · Use latex condoms every time you have sex. Use them from the beginning to the end of sexual contact. · Talk to your partner before having sex. Find out if he or she has or is at risk for trich or any other STI. Keep in mind that a person may be able to spread an STI even if he or she does not have symptoms. · Do not have sex if you are being treated for trich or any other STI. · Do not have sex with anyone who has symptoms of an STI, such as sores on the genitals or mouth. · Having one sex partner (who does not have STIs and does not have sex with anyone else) is a good way to avoid STIs. When should you call for help? Call your doctor now or seek immediate medical care if: 
? · You have unusual vaginal bleeding. ? · You have a fever. ? · You have new discharge from the vagina or penis. ? · You have pelvic pain. ? Watch closely for changes in your health, and be sure to contact your doctor if: 
? · You do not get better as expected. ? · You have any new symptoms or your symptoms get worse. Where can you learn more? Go to http://sammie-vanessa.info/. Enter J169 in the search box to learn more about \"Trichomoniasis: Care Instructions. \" Current as of: March 20, 2017 Content Version: 11.4 © 7831-0887 [employer]Yo. Care instructions adapted under license by Kelan (which disclaims liability or warranty for this information). If you have questions about a medical condition or this instruction, always ask your healthcare professional. [place] [address] any warranty or liability for your use of this information. Introducing [facility]!    
 Eris Ruiz introduces Angella Joy patient portal. Now you can access parts of your medical record, email your doctor's office, and request medication refills online. 1. In your internet browser, go to [email or web address] 2. Click on the First Time User? Click Here link in the Sign In box. You will see the New Member Sign Up page. 3. Enter your Mobissimo Access Code exactly as it appears below. You will not need to use this code after youve completed the sign-up process. If you do not sign up before the expiration date, you must request a new code. · Mobissimo Access Code: [email or web address] Expires: 5/13/2018 11:23 AM 
 
4. Enter the last four digits of your Social Security Number (xxxx) and Date of Birth (mm/dd/yyyy) as indicated and click Submit. You will be taken to the next sign-up page. 5. Create a Mobissimo ID. This will be your Mobissimo login ID and cannot be changed, so think of one that is secure and easy to remember. 6. Create a Mobissimo password. You can change your password at any time. 7. Enter your Password Reset Question and Answer. This can be used at a later time if you forget your password. 8. Enter your e-mail address. You will receive e-mail notification when new information is available in [address] 9. Click Sign Up. You can now view and download portions of your medical record. 10. Click the Download Summary menu link to download a portable copy of your medical information. If you have questions, please visit the Frequently Asked Questions section of the Mobissimo website. Remember, Mobissimo is NOT to be used for urgent needs. For medical emergencies, dial 911. Now available from your iPhone and Android! Please provide this summary of care documentation to your next provider. Your primary care clinician is listed as Iliana Monique. If you have any questions after today's visit, please call [phone].

## 2018-02-12 NOTE — PATIENT INSTRUCTIONS
Trichomoniasis: Care Instructions  Your Care Instructions  Trichomoniasis is a sexually transmitted infection (STI) that is spread by having sex with an infected partner. Trichomoniasis is commonly called trich (say \"trick\"). In women, trich may cause vaginal itching and a smelly discharge. But in many cases, especially in men, there are no symptoms. Reese Calender is treated so that you do not spread it to others. Both you and your sex partner or partners should be treated at the same time so you do not infect each other again. Trich may cause problems with pregnancy. Your doctor will talk with you about treatment for Trich if you are pregnant. Follow-up care is a key part of your treatment and safety. Be sure to make and go to all appointments, and call your doctor if you are having problems. It's also a good idea to know your test results and keep a list of the medicines you take. How can you care for yourself at home? · Take your antibiotics as directed. Do not stop taking them just because you feel better. You need to take the full course of antibiotics. · Do not have sex while you are being treated. If your doctor gave you a single dose of antibiotics, do not have sex for one week after being treated and until your partner also has been treated. · Tell your sex partner (or partners) that he or she will also need to be tested and treated. · Use a cold water compress or cool baths to relieve itching. To prevent trichomoniasis in the future  · Use latex condoms every time you have sex. Use them from the beginning to the end of sexual contact. · Talk to your partner before having sex. Find out if he or she has or is at risk for trich or any other STI. Keep in mind that a person may be able to spread an STI even if he or she does not have symptoms. · Do not have sex if you are being treated for trich or any other STI.   · Do not have sex with anyone who has symptoms of an STI, such as sores on the genitals or mouth.  · Having one sex partner (who does not have STIs and does not have sex with anyone else) is a good way to avoid STIs. When should you call for help? Call your doctor now or seek immediate medical care if:  ? · You have unusual vaginal bleeding. ? · You have a fever. ? · You have new discharge from the vagina or penis. ? · You have pelvic pain. ? Watch closely for changes in your health, and be sure to contact your doctor if:  ? · You do not get better as expected. ? · You have any new symptoms or your symptoms get worse. Where can you learn more? Go to http://sammie-vanessa.info/. Enter H722 in the search box to learn more about \"Trichomoniasis: Care Instructions. \"  Current as of: March 20, 2017  Content Version: 11.4  © 3821-8472 Healthwise, Incorporated. Care instructions adapted under license by Method CRM (which disclaims liability or warranty for this information). If you have questions about a medical condition or this instruction, always ask your healthcare professional. [address] any warranty or liability for your use of this information.

## 2018-02-12 NOTE — PROGRESS NOTES
Exam Room #1  Rubén Johnson is a 38 y.o. male  Chief Complaint   Patient presents with    Urinary Pain     pain/pressure when urinating 8/10 pain, says his girlfriend was tested and has \"utheritis\"     1. Have you been to the ER, urgent care clinic since your last visit? Hospitalized since your last visit? No    2. Have you seen or consulted any other health care providers outside of the [address] [place] [address] since your last visit? Include any pap smears or colon screening.  No    Visit Vitals    /74 (BP 1 Location: Right arm, BP Patient Position: Sitting)    Pulse 66    Temp 98.2 °F (36.8 °C) (Oral)    Resp 18    Ht 5' 10\" (1.778 m)    Wt 253 lb 9.6 oz (115 kg)    SpO2 98%    BMI 36.39 kg/m2

## 2018-02-12 NOTE — PROGRESS NOTES
HPI   Bernard Molina is a 38 y.o. male, he presents today for:    New partner in past 3 months. Reported and was  Pressure and discomfort in left lower flank when urinating. Perhaps mild diarrhea and nausea. No fever. No rash, no ulcer, no blister. Has also been seen by urologist.     PMH/PSH: reviewed and updated  Sochx:  reports that he has never smoked. He has never used smokeless tobacco. He reports that he drinks alcohol. He reports that he does not use illicit drugs. Famhx: reviewed and updated     All: No Known Allergies  Med:   Current Outpatient Prescriptions   Medication Sig    allopurinol (ZYLOPRIM) 100 mg tablet TAKE TWO TABLETS BY MOUTH ONCE DAILY     No current facility-administered medications for this visit. ROS    PE:  Blood pressure 126/74, pulse 66, temperature 98.2 °F (36.8 °C), temperature source Oral, resp. rate 18, height 5' 10\" (1.778 m), weight 253 lb 9.6 oz (115 kg), SpO2 98 %. Body mass index is 36.39 kg/(m^2). Physical Exam   Constitutional: He is oriented to person, place, and time. He appears well-developed and well-nourished. No distress. HENT:   Head: Normocephalic. Mouth/Throat: Oropharynx is clear and moist.   Eyes: Conjunctivae are normal. Pupils are equal, round, and reactive to light. Neck: Neck supple. Cardiovascular: Normal rate. Pulmonary/Chest: Effort normal.   Neurological: He is alert and oriented to person, place, and time. Skin: No rash noted. Nursing note and vitals reviewed. Labs:   See addendum    A/P:  38 y.o. male    ICD-10-CM ICD-9-CM    1. Dysuria R30.0 788.1 AMB POC URINALYSIS DIP STICK AUTO W/O MICRO      CT/NG/T.VAGINALIS AMPLIFICATION      metroNIDAZOLE (FLAGYL) 500 mg tablet      CULTURE, URINE   2. History of trichomonal urethritis Z86.19 V13.02 metroNIDAZOLE (FLAGYL) 500 mg tablet      CULTURE, URINE   3. Hematuria, unspecified type R31.9 599.70 CULTURE, URINE      URINALYSIS W/ RFLX MICROSCOPIC   4.  Leukocytes in urine R82.99 791.7 CULTURE, URINE      URINALYSIS W/ RFLX MICROSCOPIC     Urethritis, likely trichomonas, rx for metrondiazole provided, labs requested to confirm diagnosis. Advised to abstain from sexual activity for 2 weeks. - He was given AVS and expressed understanding with the diagnosis and plan as discussed. Follow-up Disposition: Not on File  No future appointments.

## 2018-02-13 NOTE — PROGRESS NOTES
Called and spoke with patient. He reported that he is feeling much improved after metronidazole. Advised that we are still waiting on results of culture and STD screen but glad to hear he is improved. Will hold on starting other antibiotic for bladder infection until further results to clarify.   Tray Street MD

## 2018-02-14 ENCOUNTER — TELEPHONE (OUTPATIENT)
Dept: INTERNAL MEDICINE CLINIC | Age: 43
End: 2018-02-14

## 2018-02-14 DIAGNOSIS — A59.00 UROGENITAL TRICHOMONIASIS: Primary | ICD-10-CM

## 2018-02-14 LAB
APPEARANCE UR: CLEAR
BACTERIA #/AREA URNS HPF: ABNORMAL /[HPF]
BACTERIA UR CULT: NO GROWTH
BILIRUB UR QL STRIP: NEGATIVE
C TRACH RRNA SPEC QL NAA+PROBE: NEGATIVE
CASTS URNS QL MICRO: ABNORMAL /LPF
COLOR UR: YELLOW
EPI CELLS #/AREA URNS HPF: ABNORMAL /HPF
GLUCOSE UR QL: NEGATIVE
HGB UR QL STRIP: ABNORMAL
KETONES UR QL STRIP: NEGATIVE
LEUKOCYTE ESTERASE UR QL STRIP: ABNORMAL
MICRO URNS: ABNORMAL
MUCOUS THREADS URNS QL MICRO: PRESENT
N GONORRHOEA RRNA SPEC QL NAA+PROBE: NEGATIVE
NITRITE UR QL STRIP: NEGATIVE
PH UR STRIP: 5.5 [PH] (ref 5–7.5)
PROT UR QL STRIP: NEGATIVE
RBC #/AREA URNS HPF: ABNORMAL /HPF
SP GR UR: 1.01 (ref 1–1.03)
T VAGINALIS RRNA SPEC QL NAA+PROBE: POSITIVE
UROBILINOGEN UR STRIP-MCNC: 0.2 MG/DL (ref 0.2–1)
WBC #/AREA URNS HPF: ABNORMAL /HPF

## 2018-02-14 RX ORDER — METRONIDAZOLE 500 MG/1
2000 TABLET ORAL DAILY
Qty: 28 TAB | Refills: 0 | Status: SHIPPED | OUTPATIENT
Start: 2018-02-14 | End: 2018-02-21

## 2018-02-14 RX ORDER — TINIDAZOLE 500 MG/1
2 TABLET ORAL DAILY
Qty: 28 TAB | Refills: 0 | Status: SHIPPED | OUTPATIENT
Start: 2018-02-14 | End: 2018-02-14

## 2018-02-14 NOTE — TELEPHONE ENCOUNTER
Since this is a recurrent infection. Please advise we are changing to tinidazole and he should take extended treatment of 2 grams daily for 7 days. No alcohol throughout this time.      Thanks  Abdi Beach MD

## 2018-02-14 NOTE — TELEPHONE ENCOUNTER
Notified patient of results and providers recommendations for laundry. Patient confirmed understanding. Patient is requesting another prescription to be sent. He states when he first was on it he felt like it was working and yesterday he \"drank a lot\" with it and felt like it is not working anymore. Patient states he was not aware that you should not drink with medication. Please advise.

## 2018-02-14 NOTE — TELEPHONE ENCOUNTER
Please advise patient that urine results:    - confirm presents of trichomonas in urine sample   - no sign of bladder infection to date. - negative test for gonorrhea and chlamydia    Please advise to wash all bedding, towels and undergarments in hot water and dry in hot cycle. Avoid sexual contact for 2 weeks following treatment. Follow-up in 1 month for repeat testing, earlier if symptoms return.      Thanks  Carlene Howard MD

## 2018-02-14 NOTE — TELEPHONE ENCOUNTER
Okay to trial metronidazole 2grams for 7 days, if this fails then will need to change approach. Advised patient, he understands.      Kary Abdul MD

## 2018-02-23 ENCOUNTER — TELEPHONE (OUTPATIENT)
Dept: INTERNAL MEDICINE CLINIC | Age: 43
End: 2018-02-23

## 2018-02-23 RX ORDER — COLCHICINE 0.6 MG/1
CAPSULE ORAL
Qty: 30 CAP | Refills: 0 | Status: SHIPPED | OUTPATIENT
Start: 2018-02-23 | End: 2018-04-04 | Stop reason: SDUPTHER

## 2018-02-23 NOTE — TELEPHONE ENCOUNTER
Per pt, he is having a gout flare near ankle and needs something called into [facility] listed on file. Pt states he is in serve pain.  Please advise # [phone]

## 2018-02-23 NOTE — TELEPHONE ENCOUNTER
Please advise if medication can be sent for patient or if they would need a visit? Sent to PCP also in case message not received by provider seen [facility].

## 2018-04-04 RX ORDER — COLCHICINE 0.6 MG/1
TABLET ORAL
Qty: 30 TAB | Refills: 0 | Status: SHIPPED | OUTPATIENT
Start: 2018-04-04 | End: 2018-06-25 | Stop reason: SDUPTHER

## 2018-06-25 RX ORDER — ALLOPURINOL 100 MG/1
TABLET ORAL
Qty: 60 TAB | Refills: 0 | Status: SHIPPED | OUTPATIENT
Start: 2018-06-25 | End: 2018-07-27 | Stop reason: SDUPTHER

## 2018-06-26 RX ORDER — COLCHICINE 0.6 MG/1
TABLET ORAL
Qty: 30 TAB | Refills: 0 | Status: SHIPPED | OUTPATIENT
Start: 2018-06-26 | End: 2018-07-27 | Stop reason: SDUPTHER

## 2018-10-01 RX ORDER — COLCHICINE 0.6 MG/1
TABLET ORAL
Qty: 30 TAB | Refills: 0 | Status: SHIPPED | OUTPATIENT
Start: 2018-10-01 | End: 2018-10-30

## 2018-10-01 RX ORDER — ALLOPURINOL 100 MG/1
TABLET ORAL
Qty: 60 TAB | Refills: 0 | Status: SHIPPED | OUTPATIENT
Start: 2018-10-01 | End: 2018-12-01 | Stop reason: SDUPTHER

## 2018-10-30 ENCOUNTER — OFFICE VISIT (OUTPATIENT)
Dept: INTERNAL MEDICINE CLINIC | Age: 43
End: 2018-10-30

## 2018-10-30 VITALS
HEART RATE: 79 BPM | RESPIRATION RATE: 18 BRPM | DIASTOLIC BLOOD PRESSURE: 76 MMHG | HEIGHT: 70 IN | BODY MASS INDEX: 36.97 KG/M2 | SYSTOLIC BLOOD PRESSURE: 140 MMHG | TEMPERATURE: 98.6 F | OXYGEN SATURATION: 98 % | WEIGHT: 258.2 LBS

## 2018-10-30 DIAGNOSIS — R73.01 IFG (IMPAIRED FASTING GLUCOSE): ICD-10-CM

## 2018-10-30 DIAGNOSIS — E78.00 PURE HYPERCHOLESTEROLEMIA: ICD-10-CM

## 2018-10-30 DIAGNOSIS — M1A.9XX0 CHRONIC GOUT INVOLVING TOE OF LEFT FOOT WITHOUT TOPHUS, UNSPECIFIED CAUSE: ICD-10-CM

## 2018-10-30 DIAGNOSIS — R82.90 URINE ABNORMALITY: Primary | ICD-10-CM

## 2018-10-30 DIAGNOSIS — R36.9 PENILE DISCHARGE: ICD-10-CM

## 2018-10-30 LAB
BILIRUB UR QL STRIP: NEGATIVE
GLUCOSE UR-MCNC: NEGATIVE MG/DL
KETONES P FAST UR STRIP-MCNC: NEGATIVE MG/DL
PH UR STRIP: 6 [PH] (ref 4.6–8)
PROT UR QL STRIP: NEGATIVE
SP GR UR STRIP: 1.01 (ref 1–1.03)
UA UROBILINOGEN AMB POC: NORMAL (ref 0.2–1)
URINALYSIS CLARITY POC: CLEAR
URINALYSIS COLOR POC: YELLOW
URINE BLOOD POC: NORMAL
URINE LEUKOCYTES POC: NEGATIVE
URINE NITRITES POC: NEGATIVE

## 2018-10-30 NOTE — PROGRESS NOTES
HISTORY OF PRESENT ILLNESS  Alice Worrell is a 37 y.o. male with history of hypertension, gout. HPI      Clear discharge, pressure on penis, feels like voiding, sexual dysfunction     Symptoms occur If IC shortly after allison's  menstrual cycles    Recently completed antibiotic prescribed by urologist for prostatitis    No condoms    Denies multiple partners    Hx of STI earlier this year    Past Medical History:   Diagnosis Date    Hypertension        Current Outpatient Medications on File Prior to Visit   Medication Sig Dispense Refill    allopurinol (ZYLOPRIM) 100 mg tablet TAKE 2 TABLETS BY MOUTH EVERY DAY 60 Tab 0     No current facility-administered medications on file prior to visit. Review of Systems   Constitutional: Negative for malaise/fatigue. Eyes: Negative. Respiratory: Negative. Cardiovascular: Negative. Gastrointestinal: Negative. Genitourinary: Negative. Musculoskeletal: Negative. Neurological: Negative. Psychiatric/Behavioral: Negative. Visit Vitals  /76 (BP 1 Location: Right arm, BP Patient Position: Sitting)   Pulse 79   Temp 98.6 °F (37 °C) (Oral)   Resp 18   Ht 5' 10\" (1.778 m)   Wt 258 lb 3.2 oz (117.1 kg)   SpO2 98%   BMI 37.05 kg/m²     Physical Exam   Constitutional: He appears well-developed and well-nourished. No distress. Cardiovascular: Normal rate, regular rhythm and normal heart sounds. Pulmonary/Chest: Effort normal and breath sounds normal.   Genitourinary:   Genitourinary Comments: Declined  exam   Musculoskeletal: He exhibits no edema, tenderness or deformity. Skin: He is not diaphoretic. ASSESSMENT and PLAN    ICD-10-CM ICD-9-CM    1. Urine abnormality R82.90 791.9 AMB POC URINALYSIS DIP STICK AUTO W/O MICRO      CT/NG/T.VAGINALIS AMPLIFICATION   2. Penile discharge R36.9 788.7 CT/NG/T.VAGINALIS AMPLIFICATION      CBC WITH AUTOMATED DIFF   3.  IFG (impaired fasting glucose) R73.01 790.21 HEMOGLOBIN A1C WITH EAG METABOLIC PANEL, COMPREHENSIVE   4. Pure hypercholesterolemia G05.09 953.2 METABOLIC PANEL, COMPREHENSIVE   5. Chronic gout involving toe of left foot without tophus, unspecified cause M1A. 9XX0 274.02 URIC ACID     Follow-up Disposition:  Return if symptoms worsen or fail to improve.  lab results and schedule of future lab studies reviewed with patient  reviewed medications and side effects in detail    Strongly encouraged safe sex    Patient voices understanding and acceptance of this advice and will call back if any further questions or concerns. An After Visit Summary was printed and given to the patient.

## 2018-10-30 NOTE — PROGRESS NOTES
Exam room 7    Jacolyn Spatz is a 37 y.o. male    Chief Complaint   Patient presents with    Other     Per patient \"penis has pressure feeling with clear discharge\"     1. Have you been to the ER, urgent care clinic since your last visit? Hospitalized since your last visit? Yes When: 8/2018 Where: [facility] Reason for visit: Tingling feeling in right hand    2. Have you seen or consulted any other health care providers outside of the [address] [place] [address] since your last visit? Include any pap smears or colon screening.  No     Health Maintenance Due   Topic Date Due    DTaP/Tdap/Td series (1 - Tdap) 04/23/1994    Influenza Age 5 to Adult  08/01/2018    MEDICARE YEARLY EXAM  10/29/2018     Refused influenza vaccine

## 2018-10-30 NOTE — LETTER
11/1/2018 8:25 AM 
 
Mr. Nabila Luther [address] [facility] [address] [phone] Dear Nabila Luther: 
 
Please find your most recent results below. Resulted Orders AMB POC URINALYSIS DIP STICK AUTO W/O MICRO Result Value Ref Range Color (UA POC) Yellow Clarity (UA POC) Clear Glucose (UA POC) Negative Negative Bilirubin (UA POC) Negative Negative Ketones (UA POC) Negative Negative Specific gravity (UA POC) 1.015 1.001 - 1.035 Blood (UA POC) 1+ Negative pH (UA POC) 6.0 4.6 - 8.0 Protein (UA POC) Negative Negative Urobilinogen (UA POC) 0.2 mg/dL 0.2 - 1 Nitrites (UA POC) Negative Negative Leukocyte esterase (UA POC) Negative Negative CT/NG/T.VAGINALIS AMPLIFICATION Result Value Ref Range C. trachomatis by [place] Negative Negative N. gonorrhoeae by [place] Negative Negative T. vaginalis by [place] Negative Negative Narrative Performed at:  [address]  [id number] : Vinay Sanchez MD, Phone:  [phone] HEMOGLOBIN A1C WITH EAG Result Value Ref Range Hemoglobin A1c 5.3 4.8 - 5.6 % Comment:  
            Prediabetes: 5.7 - 6.4 Diabetes: >6.4 Glycemic control for adults with diabetes: <7.0 Estimated average glucose 105 mg/dL Narrative Performed at:  [address]  [id number] : Vinay Sanchez MD, Phone:  [phone] CBC WITH AUTOMATED DIFF Result Value Ref Range WBC 7.4 3.4 - 10.8 x10E3/uL  
 RBC 5.44 4.14 - 5.80 x10E6/uL HGB 16.7 13.0 - 17.7 g/dL HCT 49.0 37.5 - 51.0 % MCV 90 79 - 97 fL  
 MCH 30.7 26.6 - 33.0 pg  
 MCHC 34.1 31.5 - 35.7 g/dL  
 RDW 13.9 12.3 - 15.4 % PLATELET 443 253 - 540 x10E3/uL NEUTROPHILS 63 Not Estab. % Lymphocytes 26 Not Estab. % MONOCYTES 9 Not Estab. % EOSINOPHILS 2 Not Estab. %  BASOPHILS 0 Not Estab. %  
 ABS. NEUTROPHILS 4.6 1.4 - 7.0 x10E3/uL Abs Lymphocytes 1.9 0.7 - 3.1 x10E3/uL  
 ABS. MONOCYTES 0.6 0.1 - 0.9 x10E3/uL  
 ABS. EOSINOPHILS 0.1 0.0 - 0.4 x10E3/uL  
 ABS. BASOPHILS 0.0 0.0 - 0.2 x10E3/uL IMMATURE GRANULOCYTES 0 Not Estab. %  
 ABS. IMM. GRANS. 0.0 0.0 - 0.1 x10E3/uL Narrative Performed at:  [address]  [id number] : Ling Coker MD, Phone:  [phone] METABOLIC PANEL, COMPREHENSIVE Result Value Ref Range Glucose 89 65 - 99 mg/dL BUN 13 6 - 24 mg/dL Creatinine 1.17 0.76 - 1.27 mg/dL BUN/Creatinine ratio 11 9 - 20 Sodium 143 134 - 144 mmol/L Potassium 4.3 3.5 - 5.2 mmol/L Chloride 102 96 - 106 mmol/L  
 CO2 26 20 - 29 mmol/L Calcium 9.3 8.7 - 10.2 mg/dL Protein, total 7.2 6.0 - 8.5 g/dL Albumin 4.4 3.5 - 5.5 g/dL GLOBULIN, TOTAL 2.8 1.5 - 4.5 g/dL A-G Ratio 1.6 1.2 - 2.2 Bilirubin, total 0.4 0.0 - 1.2 mg/dL Alk. phosphatase 64 39 - 117 IU/L  
 AST (SGOT) 29 0 - 40 IU/L  
 ALT (SGPT) 31 0 - 44 IU/L Narrative Performed at:  [address]  [id number] : Ling Coker MD, Phone:  [phone] URIC ACID Result Value Ref Range Uric acid 7.3 3.7 - 8.6 mg/dL Comment:  
              Therapeutic target for gout patients: <6.0 Narrative Performed at:  [address]  [id number] : Ling Coker MD, Phone:  [phone] RECOMMENDATIONS: 
Work on diet and exercise. Lab results are normal except uric acid level is higher than recommended. Take gout medication as prescribed Please call me if you have any questions: [phone] Sincerely, Henrique Dempsey NP

## 2018-10-30 NOTE — PATIENT INSTRUCTIONS
Gout: Care Instructions  Your Care Instructions    Gout is a form of arthritis caused by a buildup of uric acid crystals in a joint. It causes sudden attacks of pain, swelling, redness, and stiffness, usually in one joint, especially the big toe. Gout usually comes on without a cause. But it can be brought on by drinking alcohol (especially beer) or eating seafood and red meat. Taking certain medicines, such as diuretics or aspirin, also can bring on an attack of gout. Taking your medicines as prescribed and following up with your doctor regularly can help you avoid gout attacks in the future. Follow-up care is a key part of your treatment and safety. Be sure to make and go to all appointments, and call your doctor if you are having problems. It's also a good idea to know your test results and keep a list of the medicines you take. How can you care for yourself at home? · If the joint is swollen, put ice or a cold pack on the area for 10 to 20 minutes at a time. Put a thin cloth between the ice and your skin. · Prop up the sore limb on a pillow when you ice it or anytime you sit or lie down during the next 3 days. Try to keep it above the level of your heart. This will help reduce swelling. · Rest sore joints. Avoid activities that put weight or strain on the joints for a few days. Take short rest breaks from your regular activities during the day. · Take your medicines exactly as prescribed. Call your doctor if you think you are having a problem with your medicine. · Take pain medicines exactly as directed. ? If the doctor gave you a prescription medicine for pain, take it as prescribed. ? If you are not taking a prescription pain medicine, ask your doctor if you can take an over-the-counter medicine. · Eat less seafood and red meat. · Check with your doctor before drinking alcohol. · Losing weight, if you are overweight, may help reduce attacks of gout. But do not go on a [employer]. \" Losing a lot of weight in a short amount of time can cause a gout attack. When should you call for help? Call your doctor now or seek immediate medical care if:    · You have a fever.     · The joint is so painful you cannot use it.     · You have sudden, unexplained swelling, redness, warmth, or severe pain in one or more joints.    Watch closely for changes in your health, and be sure to contact your doctor if:    · You have joint pain.     · Your symptoms get worse or are not improving after 2 or 3 days. Where can you learn more? Go to http://sammie-vanessa.info/. Enter Y786 in the search box to learn more about \"Gout: Care Instructions. \"  Current as of: June 11, 2018  Content Version: 11.8  © 3079-2005 [employer]. Care instructions adapted under license by [employer] (which disclaims liability or warranty for this information). If you have questions about a medical condition or this instruction, always ask your healthcare professional. [place] [address] any warranty or liability for your use of this information. Learning About High Cholesterol  What is high cholesterol? Cholesterol is a type of fat in your blood. It is needed for many body functions, such as making new cells. Cholesterol is made by your body. It also comes from food you eat. If you have too much cholesterol, it starts to build up in your arteries. This is called hardening of the arteries, or atherosclerosis. High cholesterol raises your risk of a heart attack and stroke. There are different types of cholesterol. LDL is the \"bad\" cholesterol. High LDL can raise your risk for heart disease, heart attack, and stroke. HDL is the \"good\" cholesterol. High HDL is linked with a lower risk for heart disease, heart attack, and stroke. Your cholesterol levels help your doctor find out your risk for having a heart attack or stroke. How can you prevent high cholesterol?   A heart-healthy lifestyle can help you prevent high cholesterol. This lifestyle helps lower your risk for a heart attack and stroke. · Eat heart-healthy foods. ? Eat fruits, vegetables, whole grains (like oatmeal), dried beans and peas, nuts and seeds, soy products (like tofu), and fat-free or low-fat dairy products. ? Replace butter, margarine, and hydrogenated or partially hydrogenated oils with olive and canola oils. (Canola oil margarine without trans fat is fine.)  ? Replace red meat with fish, poultry, and soy protein (like tofu). ? Limit processed and packaged foods like chips, crackers, and cookies. · Be active. Exercise can improve your cholesterol level. Get at least 30 minutes of exercise on most days of the week. Walking is a good choice. You also may want to do other activities, such as running, swimming, cycling, or playing tennis or team sports. · Stay at a healthy weight. Lose weight if you need to. · Don't smoke. If you need help quitting, talk to your doctor about stop-smoking programs and medicines. These can increase your chances of quitting for good. How is high cholesterol treated? The goal of treatment is to reduce your chances of having a heart attack or stroke. The goal is not to lower your cholesterol numbers only. · You may make lifestyle changes, such as eating healthy foods, not smoking, losing weight, and being more active. · You may have to take medicine. Follow-up care is a key part of your treatment and safety. Be sure to make and go to all appointments, and call your doctor if you are having problems. It's also a good idea to know your test results and keep a list of the medicines you take. Where can you learn more? Go to http://sammie-vanessa.info/. Enter J554 in the search box to learn more about \"Learning About High Cholesterol. \"  Current as of: December 6, 2017  Content Version: 11.8  © 1556-3706 [employer].  Care instructions adapted under license by [employer] (which disclaims liability or warranty for this information). If you have questions about a medical condition or this instruction, always ask your healthcare professional. [address] any warranty or liability for your use of this information.

## 2018-10-31 LAB
ALBUMIN SERPL-MCNC: 4.4 G/DL (ref 3.5–5.5)
ALBUMIN/GLOB SERPL: 1.6 {RATIO} (ref 1.2–2.2)
ALP SERPL-CCNC: 64 IU/L (ref 39–117)
ALT SERPL-CCNC: 31 IU/L (ref 0–44)
AST SERPL-CCNC: 29 IU/L (ref 0–40)
BASOPHILS # BLD AUTO: 0 X10E3/UL (ref 0–0.2)
BASOPHILS NFR BLD AUTO: 0 %
BILIRUB SERPL-MCNC: 0.4 MG/DL (ref 0–1.2)
BUN SERPL-MCNC: 13 MG/DL (ref 6–24)
BUN/CREAT SERPL: 11 (ref 9–20)
CALCIUM SERPL-MCNC: 9.3 MG/DL (ref 8.7–10.2)
CHLORIDE SERPL-SCNC: 102 MMOL/L (ref 96–106)
CO2 SERPL-SCNC: 26 MMOL/L (ref 20–29)
CREAT SERPL-MCNC: 1.17 MG/DL (ref 0.76–1.27)
EOSINOPHIL # BLD AUTO: 0.1 X10E3/UL (ref 0–0.4)
EOSINOPHIL NFR BLD AUTO: 2 %
ERYTHROCYTE [DISTWIDTH] IN BLOOD BY AUTOMATED COUNT: 13.9 % (ref 12.3–15.4)
EST. AVERAGE GLUCOSE BLD GHB EST-MCNC: 105 MG/DL
GLOBULIN SER CALC-MCNC: 2.8 G/DL (ref 1.5–4.5)
GLUCOSE SERPL-MCNC: 89 MG/DL (ref 65–99)
HBA1C MFR BLD: 5.3 % (ref 4.8–5.6)
HCT VFR BLD AUTO: 49 % (ref 37.5–51)
HGB BLD-MCNC: 16.7 G/DL (ref 13–17.7)
IMM GRANULOCYTES # BLD: 0 X10E3/UL (ref 0–0.1)
IMM GRANULOCYTES NFR BLD: 0 %
LYMPHOCYTES # BLD AUTO: 1.9 X10E3/UL (ref 0.7–3.1)
LYMPHOCYTES NFR BLD AUTO: 26 %
MCH RBC QN AUTO: 30.7 PG (ref 26.6–33)
MCHC RBC AUTO-ENTMCNC: 34.1 G/DL (ref 31.5–35.7)
MCV RBC AUTO: 90 FL (ref 79–97)
MONOCYTES # BLD AUTO: 0.6 X10E3/UL (ref 0.1–0.9)
MONOCYTES NFR BLD AUTO: 9 %
NEUTROPHILS # BLD AUTO: 4.6 X10E3/UL (ref 1.4–7)
NEUTROPHILS NFR BLD AUTO: 63 %
PLATELET # BLD AUTO: 262 X10E3/UL (ref 150–379)
POTASSIUM SERPL-SCNC: 4.3 MMOL/L (ref 3.5–5.2)
PROT SERPL-MCNC: 7.2 G/DL (ref 6–8.5)
RBC # BLD AUTO: 5.44 X10E6/UL (ref 4.14–5.8)
SODIUM SERPL-SCNC: 143 MMOL/L (ref 134–144)
URATE SERPL-MCNC: 7.3 MG/DL (ref 3.7–8.6)
WBC # BLD AUTO: 7.4 X10E3/UL (ref 3.4–10.8)

## 2018-11-01 ENCOUNTER — TELEPHONE (OUTPATIENT)
Dept: INTERNAL MEDICINE CLINIC | Age: 43
End: 2018-11-01

## 2018-11-01 LAB
C TRACH RRNA VAG QL NAA+PROBE: NEGATIVE
N GONORRHOEA RRNA VAG QL NAA+PROBE: NEGATIVE
T VAGINALIS RRNA VAG QL NAA+PROBE: NEGATIVE

## 2018-11-01 NOTE — LETTER
11/1/2018 11:01 AM 
 
Mr. Gloria Millard [address] [facility] [address]  [address] [id number] Dear Gloria Millard I've reviewed your test results as listed below. Results are normal/stable unless otherwise noted. 11/1/2018  5:42 AM - [place], [facility] Lab Results In Component Value Flag Ref Range Units Status C. trachomatis by [place] Negative   Negative  Final 
N. gonorrhoeae by [place] Negative   Negative  Final 
T. vaginalis by [place] Negative   Negative  Final 
 
10/31/2018  9:40 AM - [place], [facility] Lab Results In Component Value Flag Ref Range Units Status Hemoglobin A1c 5.3  4.8 - 5.6 % Final 
 
 
Component Value Flag Ref Range Units Status Uric acid 7.3   3.7 - 8.6 mg/dL Final 
 
 
Component Value Flag Ref Range Units Status Glucose 89   65 - 99 mg/dL Final  
BUN 13   6 - 24 mg/dL Final  
Creatinine 1.17   0.76 - 1.27 mg/dL Final  
BUN/Creatinine ratio 11   9 - 20  Final  
Sodium 143   134 - 144 mmol/L Final  
Potassium 4.3   3.5 - 5.2 mmol/L Final  
Chloride 102   96 - 106 mmol/L Final  
CO2 26   20 - 29 mmol/L Final  
Calcium 9.3   8.7 - 10.2 mg/dL Final  
Protein, total 7.2   6.0 - 8.5 g/dL Final  
Albumin 4.4   3.5 - 5.5 g/dL Final  
GLOBULIN, TOTAL 2.8   1.5 - 4.5 g/dL Final  
A-G Ratio 1.6   1.2 - 2.2  Final  
Bilirubin, total 0.4   0.0 - 1.2 mg/dL Final  
Alk. phosphatase 64   39 - 117 IU/L Final  
AST (SGOT) 29   0 - 40 IU/L Final  
ALT (SGPT) 31   0 - 44 IU/L Final  
 
 
 
 
 
 
 
 
RECOMMENDATIONS: 
\"None. Keep up the good work! \" Please call me if you have any questions: [phone] Sincerely, Apolonia Padilla NP

## 2018-12-03 RX ORDER — ALLOPURINOL 100 MG/1
TABLET ORAL
Qty: 60 TAB | Refills: 0 | Status: SHIPPED | OUTPATIENT
Start: 2018-12-03 | End: 2019-02-02 | Stop reason: SDUPTHER

## 2018-12-03 RX ORDER — COLCHICINE 0.6 MG/1
CAPSULE ORAL
Qty: 30 CAP | Refills: 0 | Status: SHIPPED | OUTPATIENT
Start: 2018-12-03 | End: 2019-02-02 | Stop reason: SDUPTHER

## 2019-02-04 RX ORDER — COLCHICINE 0.6 MG/1
CAPSULE ORAL
Qty: 30 CAP | Refills: 0 | Status: SHIPPED | OUTPATIENT
Start: 2019-02-04 | End: 2019-04-27 | Stop reason: SDUPTHER

## 2019-02-04 RX ORDER — ALLOPURINOL 100 MG/1
TABLET ORAL
Qty: 60 TAB | Refills: 0 | Status: SHIPPED | OUTPATIENT
Start: 2019-02-04 | End: 2019-04-27 | Stop reason: SDUPTHER

## 2019-04-29 RX ORDER — COLCHICINE 0.6 MG/1
CAPSULE ORAL
Qty: 30 CAP | Refills: 0 | Status: SHIPPED | OUTPATIENT
Start: 2019-04-29 | End: 2019-10-17 | Stop reason: SDUPTHER

## 2019-04-29 RX ORDER — ALLOPURINOL 100 MG/1
TABLET ORAL
Qty: 60 TAB | Refills: 0 | Status: SHIPPED | OUTPATIENT
Start: 2019-04-29 | End: 2019-07-07 | Stop reason: SDUPTHER

## 2019-05-06 ENCOUNTER — OFFICE VISIT (OUTPATIENT)
Dept: INTERNAL MEDICINE CLINIC | Age: 44
End: 2019-05-06

## 2019-05-06 ENCOUNTER — TELEPHONE (OUTPATIENT)
Dept: INTERNAL MEDICINE CLINIC | Age: 44
End: 2019-05-06

## 2019-05-06 VITALS
TEMPERATURE: 98.6 F | OXYGEN SATURATION: 97 % | BODY MASS INDEX: 37.37 KG/M2 | DIASTOLIC BLOOD PRESSURE: 83 MMHG | HEIGHT: 70 IN | HEART RATE: 74 BPM | RESPIRATION RATE: 18 BRPM | WEIGHT: 261 LBS | SYSTOLIC BLOOD PRESSURE: 145 MMHG

## 2019-05-06 DIAGNOSIS — R30.0 DYSURIA: Primary | ICD-10-CM

## 2019-05-06 DIAGNOSIS — R30.9 PAIN PASSING URINE: ICD-10-CM

## 2019-05-06 DIAGNOSIS — R03.0 ELEVATED BLOOD PRESSURE READING: ICD-10-CM

## 2019-05-06 DIAGNOSIS — E78.00 PURE HYPERCHOLESTEROLEMIA: ICD-10-CM

## 2019-05-06 DIAGNOSIS — E66.01 SEVERE OBESITY (HCC): ICD-10-CM

## 2019-05-06 DIAGNOSIS — R82.90 ABNORMAL URINE FINDINGS: ICD-10-CM

## 2019-05-06 LAB
BILIRUB UR QL STRIP: NEGATIVE
GLUCOSE UR-MCNC: NEGATIVE MG/DL
KETONES P FAST UR STRIP-MCNC: NEGATIVE MG/DL
PH UR STRIP: 6 [PH] (ref 4.6–8)
PROT UR QL STRIP: NEGATIVE
SP GR UR STRIP: 1.02 (ref 1–1.03)
UA UROBILINOGEN AMB POC: NORMAL (ref 0.2–1)
URINALYSIS CLARITY POC: CLEAR
URINALYSIS COLOR POC: YELLOW
URINE BLOOD POC: NORMAL
URINE LEUKOCYTES POC: NORMAL
URINE NITRITES POC: NEGATIVE

## 2019-05-06 RX ORDER — CEFIXIME 400 MG/1
400 CAPSULE ORAL ONCE
Qty: 1 CAP | Refills: 0 | Status: SHIPPED | OUTPATIENT
Start: 2019-05-06 | End: 2019-05-06

## 2019-05-06 RX ORDER — AZITHROMYCIN 250 MG/1
1000 TABLET, FILM COATED ORAL ONCE
Qty: 4 TAB | Refills: 0 | Status: SHIPPED | OUTPATIENT
Start: 2019-05-06 | End: 2019-05-06

## 2019-05-06 NOTE — PATIENT INSTRUCTIONS
Painful Urination (Dysuria): Care Instructions  Your Care Instructions  Burning pain with urination (dysuria) is a common symptom of a urinary tract infection or other urinary problems. The bladder may become inflamed. This can cause pain when the bladder fills and empties. You may also feel pain if the tube that carries urine from the bladder to the outside of the body (urethra) gets irritated or infected. Sexually transmitted infections (STIs) also may cause pain when you urinate. Sometimes the pain can be caused by things other than an infection. The urethra can be irritated by soaps, perfumes, or foreign objects in the urethra. Kidney stones can cause pain when they pass through the urethra. The cause may be hard to find. You may need tests. Treatment for painful urination depends on the cause. Follow-up care is a key part of your treatment and safety. Be sure to make and go to all appointments, and call your doctor if you are having problems. It's also a good idea to know your test results and keep a list of the medicines you take. How can you care for yourself at home? · Drink extra water for the next day or two. This will help make the urine less concentrated. (If you have kidney, heart, or liver disease and have to limit fluids, talk with your doctor before you increase the amount of fluids you drink.)  · Avoid drinks that are carbonated or have caffeine. They can irritate the bladder. · Urinate often. Try to empty your bladder each time. For women:  · Urinate right after you have sex. · After going to the bathroom, wipe from front to back. · Avoid douches, bubble baths, and feminine hygiene sprays. And avoid other feminine hygiene products that have deodorants. When should you call for help? Call your doctor now or seek immediate medical care if:    · You have new symptoms, such as fever, nausea, or vomiting.     · You have new or worse symptoms of a urinary problem. For example:  ?  You have blood or pus in your urine. ? You have chills or body aches. ? It hurts worse to urinate. ? You have groin or belly pain. ? You have pain in your back just below your rib cage (the flank area).    Watch closely for changes in your health, and be sure to contact your doctor if you have any problems. Where can you learn more? Go to http://sammie-vanessa.info/. Enter F815 in the search box to learn more about \"Painful Urination (Dysuria): Care Instructions. \"  Current as of: March 20, 2018  Content Version: 11.9  © 1932-3506 [employer]. Care instructions adapted under license by [employer] (which disclaims liability or warranty for this information). If you have questions about a medical condition or this instruction, always ask your healthcare professional. [address] any warranty or liability for your use of this information. Body Mass Index: Care Instructions  Your Care Instructions    Body mass index (BMI) can help you see if your weight is raising your risk for health problems. It uses a formula to compare how much you weigh with how tall you are. · A BMI lower than 18.5 is considered underweight. · A BMI between 18.5 and 24.9 is considered healthy. · A BMI between 25 and 29.9 is considered overweight. A BMI of 30 or higher is considered obese. If your BMI is in the normal range, it means that you have a lower risk for weight-related health problems. If your BMI is in the overweight or obese range, you may be at increased risk for weight-related health problems, such as high blood pressure, heart disease, stroke, arthritis or joint pain, and diabetes. If your BMI is in the underweight range, you may be at increased risk for health problems such as fatigue, lower protection (immunity) against illness, muscle loss, bone loss, hair loss, and hormone problems.   BMI is just one measure of your risk for weight-related health problems. You may be at higher risk for health problems if you are not active, you eat an unhealthy diet, or you drink too much alcohol or use tobacco products. Follow-up care is a key part of your treatment and safety. Be sure to make and go to all appointments, and call your doctor if you are having problems. It's also a good idea to know your test results and keep a list of the medicines you take. How can you care for yourself at home? · Practice healthy eating habits. This includes eating plenty of fruits, vegetables, whole grains, lean protein, and low-fat dairy. · If your doctor recommends it, get more exercise. Walking is a good choice. Bit by bit, increase the amount you walk every day. Try for at least 30 minutes on most days of the week. · Do not smoke. Smoking can increase your risk for health problems. If you need help quitting, talk to your doctor about stop-smoking programs and medicines. These can increase your chances of quitting for good. · Limit alcohol to 2 drinks a day for men and 1 drink a day for women. Too much alcohol can cause health problems. If you have a BMI higher than 25  · Your doctor may do other tests to check your risk for weight-related health problems. This may include measuring the distance around your waist. A waist measurement of more than 40 inches in men or 35 inches in women can increase the risk of weight-related health problems. · Talk with your doctor about steps you can take to stay healthy or improve your health. You may need to make lifestyle changes to lose weight and stay healthy, such as changing your diet and getting regular exercise. If you have a BMI lower than 18.5  · Your doctor may do other tests to check your risk for health problems. · Talk with your doctor about steps you can take to stay healthy or improve your health.  You may need to make lifestyle changes to gain or maintain weight and stay healthy, such as getting more healthy foods in your diet and doing exercises to build muscle. Where can you learn more? Go to http://sammie-vanessa.info/. Enter S176 in the search box to learn more about \"Body Mass Index: Care Instructions. \"  Current as of: June 25, 2018  Content Version: 11.9  © 3433-9079 [employer]. Care instructions adapted under license by [employer] (which disclaims liability or warranty for this information). If you have questions about a medical condition or this instruction, always ask your healthcare professional. [address] any warranty or liability for your use of this information. High Cholesterol: Care Instructions  Your Care Instructions    Cholesterol is a type of fat in your blood. It is needed for many body functions, such as making new cells. Cholesterol is made by your body. It also comes from food you eat. High cholesterol means that you have too much of the fat in your blood. This raises your risk of a heart attack and stroke. LDL and HDL are part of your total cholesterol. LDL is the \"bad\" cholesterol. High LDL can raise your risk for heart disease, heart attack, and stroke. HDL is the \"good\" cholesterol. It helps clear bad cholesterol from the body. High HDL is linked with a lower risk of heart disease, heart attack, and stroke. Your cholesterol levels help your doctor find out your risk for having a heart attack or stroke. You and your doctor can talk about whether you need to lower your risk and what treatment is best for you. A heart-healthy lifestyle along with medicines can help lower your cholesterol and your risk. The way you choose to lower your risk will depend on how high your risk is for heart attack and stroke. It will also depend on how you feel about taking medicines. Follow-up care is a key part of your treatment and safety. Be sure to make and go to all appointments, and call your doctor if you are having problems.  It's also a good idea to know your test results and keep a list of the medicines you take. How can you care for yourself at home? · Eat a variety of foods every day. Good choices include fruits, vegetables, whole grains (like oatmeal), dried beans and peas, nuts and seeds, soy products (like tofu), and fat-free or low-fat dairy products. · Replace butter, margarine, and hydrogenated or partially hydrogenated oils with olive and canola oils. (Canola oil margarine without trans fat is fine.)  · Replace red meat with fish, poultry, and soy protein (like tofu). · Limit processed and packaged foods like chips, crackers, and cookies. · Bake, broil, or steam foods. Don't whitaker them. · Be physically active. Get at least 30 minutes of exercise on most days of the week. Walking is a good choice. You also may want to do other activities, such as running, swimming, cycling, or playing tennis or team sports. · Stay at a healthy weight or lose weight by making the changes in eating and physical activity listed above. Losing just a small amount of weight, even 5 to 10 pounds, can reduce your risk for having a heart attack or stroke. · Do not smoke. When should you call for help? Watch closely for changes in your health, and be sure to contact your doctor if:    · You need help making lifestyle changes.     · You have questions about your medicine. Where can you learn more? Go to http://sammie-vanessa.info/. Enter F953 in the search box to learn more about \"High Cholesterol: Care Instructions. \"  Current as of: July 22, 2018  Content Version: 11.9  © 4343-7918 [email or web address]. Care instructions adapted under license by [employer] (which disclaims liability or warranty for this information). If you have questions about a medical condition or this instruction, always ask your healthcare professional. [address] any warranty or liability for your use of this information.

## 2019-05-06 NOTE — TELEPHONE ENCOUNTER
Two pt identifiers confirmed. Spoke to Pharmacist in regards to Suprax medication. Mr. Aggie Etienne stated in office, that pharmacy received suprax rx in liquid form. Called pharmacy to verify that rx was called in for capsules. Pharmacist informed me that they were out of stock on suprax capsule and pt was notified. Pharmacist verbalized understanding of information discussed w/ no further questions at this time.

## 2019-05-06 NOTE — PROGRESS NOTES
Identified pt with two pt identifiers(name and ). Reviewed record in preparation for visit and have obtained necessary documentation. All patient medications has been reviewed. Chief Complaint   Patient presents with    Urinary Pain     x3-4 days. Pt states, mild discharge. Mild back pain       Health Maintenance Due   Topic    DTaP/Tdap/Td series (1 - Tdap)       Vitals:    19 1039   BP: 145/83   Pulse: 74   Resp: 18   Temp: 98.6 °F (37 °C)   TempSrc: Oral   SpO2: 97%   Weight: 261 lb (118.4 kg)   Height: 5' 10\" (1.778 m)   PainSc:   8   PainLoc: Groin       Coordination of Care Questionnaire:   1) Have you been to an emergency room, urgent care, or hospitalized since your last visit?   no       2. Have seen or consulted any other health care provider since your last visit? NO    3) Do you have an Advanced Directive/ Living Will in place? NO  If yes, do we have a copy on file NO  If no, would you like information NO    Patient is accompanied by self I have received verbal consent from Vandana Rowell to discuss any/all medical information while they are present in the room.

## 2019-05-06 NOTE — PROGRESS NOTES
HISTORY OF PRESENT ILLNESS  Vandana Rowell is a 53 y.o. male presents for acute care   HPI     He reports yellow penile discharge. He believes he has STD and requests to be treated. He denies new partner    Received a powdered medication and shot on recent urology evaluation for similar problem. Bad reaction to shot, had difficulty breathing when he arrived at pharmacy. This resolved spontaneously     Hx of ED, prostatitis, BPH, urethritis, genital herpes. S/p cystoscopy   . Not taking cholesterol or blood pressure medication. Past Medical History:   Diagnosis Date    Hypertension        Current Outpatient Medications   Medication Sig    allopurinol (ZYLOPRIM) 100 mg tablet TAKE 2 TABLETS BY MOUTH EVERY DAY    colchicine (MITIGARE) 0.6 mg capsule TAKE 2 TABLETS BY MOUTH NOW THEN 1 TABLET 1 HOUR LATER **NEED APPOINTMENT     No current facility-administered medications for this visit. No Known Allergies  Review of Systems   Constitutional: Negative. Gastrointestinal: Negative. Genitourinary: Negative for dysuria, hematuria and urgency. Musculoskeletal: Negative for back pain and myalgias. Skin: Negative for itching and rash. Visit Vitals  /83 (BP 1 Location: Left arm, BP Patient Position: Sitting)   Pulse 74   Temp 98.6 °F (37 °C) (Oral)   Resp 18   Ht 5' 10\" (1.778 m)   Wt 261 lb (118.4 kg)   SpO2 97%   BMI 37.45 kg/m²       Physical Exam   Constitutional: He is oriented to person, place, and time. He appears well-developed and well-nourished. No distress. Cardiovascular: Normal rate and regular rhythm. Pulmonary/Chest: Effort normal and breath sounds normal.   Genitourinary:   Genitourinary Comments: Declined    Neurological: He is alert and oriented to person, place, and time. Skin: Skin is warm and dry. No rash noted. He is not diaphoretic. No erythema. Psychiatric: His behavior is normal. Judgment and thought content normal. His mood appears anxious.  His speech is rapid and/or pressured. ASSESSMENT and PLAN    ICD-10-CM ICD-9-CM    1. Dysuria R30.0 788. 1 CULTURE, URINE      CT/NG/T.VAGINALIS AMPLIFICATION      azithromycin (ZITHROMAX) 250 mg tablet      cefixime (SUPRAX) 400 mg capsule   2. Pain passing urine R30.9 788.1 AMB POC URINALYSIS DIP STICK MANUAL W/O MICRO   3. Severe obesity ([address] E66.01 278.01    4. Abnormal urine findings R82.90 791.9 CULTURE, URINE      CT/NG/T.VAGINALIS AMPLIFICATION      azithromycin (ZITHROMAX) 250 mg tablet      cefixime (SUPRAX) 400 mg capsule   5. Elevated blood pressure reading R03.0 796.2    6. Pure hypercholesterolemia E78.00 272.0      Follow-up and Dispositions    · Return in about 1 month (around 6/3/2019) for htn, fasting labs. He requests empiric therapy for STI.    Encouraged use of condoms, follow up with urology and return for routine visit       lab results and schedule of future lab studies reviewed with patient  reviewed medications and side effects in detail

## 2019-05-08 LAB
BACTERIA UR CULT: NORMAL
C TRACH RRNA SPEC QL NAA+PROBE: NEGATIVE
N GONORRHOEA RRNA SPEC QL NAA+PROBE: NEGATIVE
T VAGINALIS RRNA VAG QL NAA+PROBE: NEGATIVE

## 2019-05-09 ENCOUNTER — TELEPHONE (OUTPATIENT)
Dept: INTERNAL MEDICINE CLINIC | Age: 44
End: 2019-05-09

## 2019-05-09 NOTE — TELEPHONE ENCOUNTER
Spoke with patient after verifying name and . Advised him of urine results from [facility]. He expressed understanding.

## 2019-07-08 RX ORDER — ALLOPURINOL 100 MG/1
TABLET ORAL
Qty: 60 TAB | Refills: 0 | Status: SHIPPED | OUTPATIENT
Start: 2019-07-08 | End: 2019-09-18 | Stop reason: SDUPTHER

## 2019-09-18 RX ORDER — ALLOPURINOL 100 MG/1
TABLET ORAL
Qty: 60 TAB | Refills: 0 | Status: SHIPPED | OUTPATIENT
Start: 2019-09-18 | End: 2019-10-17 | Stop reason: SDUPTHER

## 2019-10-18 RX ORDER — COLCHICINE 0.6 MG/1
CAPSULE ORAL
Qty: 30 CAP | Refills: 0 | Status: SHIPPED | OUTPATIENT
Start: 2019-10-18 | End: 2019-11-12 | Stop reason: SDUPTHER

## 2019-10-18 RX ORDER — ALLOPURINOL 100 MG/1
TABLET ORAL
Qty: 60 TAB | Refills: 0 | Status: SHIPPED | OUTPATIENT
Start: 2019-10-18 | End: 2019-11-12 | Stop reason: SDUPTHER

## 2019-11-12 ENCOUNTER — OFFICE VISIT (OUTPATIENT)
Dept: INTERNAL MEDICINE CLINIC | Age: 44
End: 2019-11-12

## 2019-11-12 VITALS
RESPIRATION RATE: 16 BRPM | OXYGEN SATURATION: 98 % | WEIGHT: 256 LBS | TEMPERATURE: 98.3 F | SYSTOLIC BLOOD PRESSURE: 159 MMHG | HEIGHT: 70 IN | BODY MASS INDEX: 36.65 KG/M2 | HEART RATE: 75 BPM | DIASTOLIC BLOOD PRESSURE: 90 MMHG

## 2019-11-12 DIAGNOSIS — M1A.9XX0 CHRONIC GOUT INVOLVING TOE OF LEFT FOOT WITHOUT TOPHUS, UNSPECIFIED CAUSE: ICD-10-CM

## 2019-11-12 DIAGNOSIS — R36.9 PENILE DISCHARGE: ICD-10-CM

## 2019-11-12 DIAGNOSIS — R30.0 DYSURIA: Primary | ICD-10-CM

## 2019-11-12 DIAGNOSIS — R03.0 ELEVATED BLOOD PRESSURE READING: ICD-10-CM

## 2019-11-12 LAB
BILIRUB UR QL STRIP: NEGATIVE
GLUCOSE UR-MCNC: NEGATIVE MG/DL
KETONES P FAST UR STRIP-MCNC: NEGATIVE MG/DL
PH UR STRIP: 7 [PH] (ref 4.6–8)
PROT UR QL STRIP: NEGATIVE
SP GR UR STRIP: 1.01 (ref 1–1.03)
UA UROBILINOGEN AMB POC: NORMAL (ref 0.2–1)
URINALYSIS CLARITY POC: CLEAR
URINALYSIS COLOR POC: YELLOW
URINE BLOOD POC: NORMAL
URINE LEUKOCYTES POC: NEGATIVE
URINE NITRITES POC: NEGATIVE

## 2019-11-12 RX ORDER — CEFIXIME 400 MG/1
400 CAPSULE ORAL ONCE
Qty: 1 CAP | Refills: 0 | Status: SHIPPED | OUTPATIENT
Start: 2019-11-12 | End: 2020-12-03 | Stop reason: SDUPTHER

## 2019-11-12 RX ORDER — AZITHROMYCIN 250 MG/1
1000 TABLET, FILM COATED ORAL ONCE
Qty: 6 TAB | Refills: 0 | Status: SHIPPED | OUTPATIENT
Start: 2019-11-12 | End: 2020-12-03 | Stop reason: SDUPTHER

## 2019-11-12 RX ORDER — COLCHICINE 0.6 MG/1
CAPSULE ORAL
Qty: 30 CAP | Refills: 0 | Status: SHIPPED | OUTPATIENT
Start: 2019-11-12 | End: 2020-03-23

## 2019-11-12 RX ORDER — ALLOPURINOL 100 MG/1
TABLET ORAL
Qty: 60 TAB | Refills: 0 | Status: SHIPPED | OUTPATIENT
Start: 2019-11-12 | End: 2020-03-23

## 2019-11-12 NOTE — PATIENT INSTRUCTIONS
Gout: Care Instructions  Your Care Instructions    Gout is a form of arthritis caused by a buildup of uric acid crystals in a joint. It causes sudden attacks of pain, swelling, redness, and stiffness, usually in one joint, especially the big toe. Gout usually comes on without a cause. But it can be brought on by drinking alcohol (especially beer) or eating seafood and red meat. Taking certain medicines, such as diuretics or aspirin, also can bring on an attack of gout. Taking your medicines as prescribed and following up with your doctor regularly can help you avoid gout attacks in the future. Follow-up care is a key part of your treatment and safety. Be sure to make and go to all appointments, and call your doctor if you are having problems. It's also a good idea to know your test results and keep a list of the medicines you take. How can you care for yourself at home? · If the joint is swollen, put ice or a cold pack on the area for 10 to 20 minutes at a time. Put a thin cloth between the ice and your skin. · Prop up the sore limb on a pillow when you ice it or anytime you sit or lie down during the next 3 days. Try to keep it above the level of your heart. This will help reduce swelling. · Rest sore joints. Avoid activities that put weight or strain on the joints for a few days. Take short rest breaks from your regular activities during the day. · Take your medicines exactly as prescribed. Call your doctor if you think you are having a problem with your medicine. · Take pain medicines exactly as directed. ? If the doctor gave you a prescription medicine for pain, take it as prescribed. ? If you are not taking a prescription pain medicine, ask your doctor if you can take an over-the-counter medicine. · Eat less seafood and red meat. · Check with your doctor before drinking alcohol. · Losing weight, if you are overweight, may help reduce attacks of gout. But do not go on a [employer]. \" Losing a lot of weight in a short amount of time can cause a gout attack. When should you call for help? Call your doctor now or seek immediate medical care if:    · You have a fever.     · The joint is so painful you cannot use it.     · You have sudden, unexplained swelling, redness, warmth, or severe pain in one or more joints.    Watch closely for changes in your health, and be sure to contact your doctor if:    · You have joint pain.     · Your symptoms get worse or are not improving after 2 or 3 days. Where can you learn more? Go to http://sammie-vanessa.info/. Enter R929 in the search box to learn more about \"Gout: Care Instructions. \"  Current as of: April 1, 2019  Content Version: 12.2  © 6128-0717 [employer]. Care instructions adapted under license by [employer] (which disclaims liability or warranty for this information). If you have questions about a medical condition or this instruction, always ask your healthcare professional. [place] [address] any warranty or liability for your use of this information. Purine-Restricted Diet: Care Instructions  Your Care Instructions    Purines are substances that are found in some foods. Your body turns purines into uric acid. High levels of uric acid can cause gout, which is a form of arthritis that causes pain and inflammation in joints. You may be able to help control the amount of uric acid in your body by limiting high-purine foods in your diet. Follow-up care is a key part of your treatment and safety. Be sure to make and go to all appointments, and call your doctor if you are having problems. It's also a good idea to know your test results and keep a list of the medicines you take. How can you care for yourself at home? · Plan your meals and snacks around foods that are low in purines and are safe for you to eat. These foods include:  ? Green vegetables and tomatoes. ? Fruits. ?  Whole-grain breads, rice, and cereals. ? Eggs, peanut butter, and nuts. ? Low-fat milk, cheese, and other milk products. ? Popcorn. ? Gelatin desserts, chocolate, cocoa, and cakes and sweets, in small amounts. · You can eat certain foods that are medium-high in purines, but eat them only once in a while. These foods include:  ? Legumes, such as dried beans and dried peas. You can have 1 cup cooked legumes each day. ? Asparagus, cauliflower, spinach, mushrooms, and green peas. ? Fish and seafood (other than very high-purine seafood). ? Oatmeal, wheat bran, and wheat germ. · Limit very high-purine foods, including:  ? Organ meats, such as liver, kidneys, sweetbreads, and brains. ? Meats, including ray, beef, pork, and lamb. ? Game meats and any other meats in large amounts. ? Anchovies, sardines, herring, mackerel, and scallops. ? Gravy. ? Beer. Where can you learn more? Go to http://sammieGen One Cigvanessa.info/. Enter F448 in the search box to learn more about \"Purine-Restricted Diet: Care Instructions. \"  Current as of: November 7, 2018  Content Version: 12.2  © 8624-3249 [employer]. Care instructions adapted under license by [employer] (which disclaims liability or warranty for this information). If you have questions about a medical condition or this instruction, always ask your healthcare professional. [place] [address] any warranty or liability for your use of this information. Elevated Blood Pressure: Care Instructions  Your Care Instructions    Blood pressure is a measure of how hard the blood pushes against the walls of your arteries. It's normal for blood pressure to go up and down throughout the day. But if it stays up over time, you have high blood pressure. Two numbers tell you your blood pressure. The first number is the systolic pressure. It shows how hard the blood pushes when your heart is pumping.  The second number is the diastolic pressure. It shows how hard the blood pushes between heartbeats, when your heart is relaxed and filling with blood. An ideal blood pressure in adults is less than 120/80 (say \"120 over 80\"). High blood pressure is 140/90 or higher. You have high blood pressure if your top number is 140 or higher or your bottom number is 90 or higher, or both. The main test for high blood pressure is simple, fast, and painless. To diagnose high blood pressure, your doctor will test your blood pressure at different times. After testing your blood pressure, your doctor may ask you to test it again when you are home. If you are diagnosed with high blood pressure, you can work with your doctor to make a long-term plan to manage it. Follow-up care is a key part of your treatment and safety. Be sure to make and go to all appointments, and call your doctor if you are having problems. It's also a good idea to know your test results and keep a list of the medicines you take. How can you care for yourself at home? · Do not smoke. Smoking increases your risk for heart attack and stroke. If you need help quitting, talk to your doctor about stop-smoking programs and medicines. These can increase your chances of quitting for good. · Stay at a healthy weight. · Try to limit how much sodium you eat to less than 2,300 milligrams (mg) a day. Your doctor may ask you to try to eat less than 1,500 mg a day. · Be physically active. Get at least 30 minutes of exercise on most days of the week. Walking is a good choice. You also may want to do other activities, such as running, swimming, cycling, or playing tennis or team sports. · Avoid or limit alcohol. Talk to your doctor about whether you can drink any alcohol. · Eat plenty of fruits, vegetables, and low-fat dairy products. Eat less saturated and total fats. · Learn how to check your blood pressure at home. When should you call for help?   Call your doctor now or seek immediate medical care if:  ? · Your blood pressure is much higher than normal (such as 180/110 or higher). ? · You think high blood pressure is causing symptoms such as:  ¨ Severe headache. ¨ Blurry vision. ? Watch closely for changes in your health, and be sure to contact your doctor if:  ? · You do not get better as expected. Where can you learn more? Go to http://sammie-vanessa.info/. Enter S808 in the search box to learn more about \"Elevated Blood Pressure: Care Instructions. \"  Current as of: September 21, 2016  Content Version: 11.4  © 4674-7322 [employer]. Care instructions adapted under license by [employer] (which disclaims liability or warranty for this information). If you have questions about a medical condition or this instruction, always ask your healthcare professional. [address] any warranty or liability for your use of this information.

## 2019-11-12 NOTE — PROGRESS NOTES
Rm 7    Chief Complaint   Patient presents with    Urinary Burning    Urinary Odor    Penile Discharge     white discharge   Sx started on Sunday, not sure if he has come in contact with someone with STI, did recently have sex. 1. Have you been to the ER, urgent care clinic since your last visit? Hospitalized since your last visit? No    2. Have you seen or consulted any other health care providers outside of the [address] [place] [address] since your last visit? Include any pap smears or colon screening.  No    Health Maintenance Due   Topic Date Due    DTaP/Tdap/Td series (1 - Tdap) 04/23/1994    Influenza Age 5 to Adult  08/01/2019   flu vaccine refused    3 most recent PHQ Screens 11/12/2019   Little interest or pleasure in doing things Not at all   Feeling down, depressed, irritable, or hopeless Not at all   Total Score PHQ 2 0     Learning Assessment 8/10/2015   PRIMARY LEARNER Patient   HIGHEST LEVEL OF EDUCATION - PRIMARY LEARNER  GRADUATED HIGH SCHOOL OR GED   BARRIERS PRIMARY LEARNER NONE   PRIMARY LANGUAGE ENGLISH   LEARNER PREFERENCE PRIMARY READING     DEMONSTRATION   ANSWERED BY self   RELATIONSHIP SELF

## 2019-11-13 NOTE — PROGRESS NOTES
HISTORY OF PRESENT ILLNESS  Elijah Lorenzo is a 53 y.o. male presents for acute care   HPI  He reports dysuria, penile discharge and urinary odor since Sunday    [place] with new partner, on Saturday. Partner had no complaints    Requests oral antibiotics as on prior visit  to treat STI. SOB with  Rocephin     History of similar symptoms with negative lab evaluation  Urology evaluation for same earlier this year      Request gout medication refill. Not taking medication,recently flare    Past Medical History:   Diagnosis Date    Hypertension        Current Outpatient Medications   Medication Sig    allopurinol (ZYLOPRIM) 100 mg tablet TAKE 2 TABLETS BY MOUTH EVERY DAY    colchicine (MITIGARE) 0.6 mg capsule TAKE 2 TABLETS BY MOUTH NOW THEN 1 TABLET 1 HOUR LATER **NEED APPOINTMENT     No current facility-administered medications for this visit. Allergies   Allergen Reactions    Rocephin [Ceftriaxone] Shortness of Breath       History reviewed. No pertinent surgical history. Review of Systems   Respiratory: Negative. Cardiovascular: Negative. Gastrointestinal: Negative. Genitourinary: Positive for dysuria. Negative for flank pain, frequency, hematuria and urgency. Skin: Negative for itching and rash. Neurological: Negative. Psychiatric/Behavioral: Negative. Visit Vitals  /90 (BP 1 Location: Left arm, BP Patient Position: Sitting)   Pulse 75   Temp 98.3 °F (36.8 °C) (Oral)   Resp 16   Ht 5' 10\" (1.778 m)   Wt 256 lb (116.1 kg)   SpO2 98%   BMI 36.73 kg/m²     Physical Exam   Constitutional: He is oriented to person, place, and time. He appears well-developed and well-nourished. No distress. Eyes: Conjunctivae are normal. Right eye exhibits no discharge. Left eye exhibits no discharge. No scleral icterus. Cardiovascular: Normal rate and regular rhythm.    Pulmonary/Chest: Effort normal and breath sounds normal.   Genitourinary:   Genitourinary Comments: He declined  exam Neurological: He is alert and oriented to person, place, and time. Skin: Skin is warm and dry. No rash noted. He is not diaphoretic. No erythema. Psychiatric: He has a normal mood and affect. His behavior is normal. Judgment and thought content normal.       ASSESSMENT and PLAN    ICD-10-CM ICD-9-CM    1. Dysuria R30.0 788.1 AMB POC URINALYSIS DIP STICK AUTO W/O MICRO      azithromycin (ZITHROMAX) 250 mg tablet      cefixime (SUPRAX) 400 mg capsule   2. Chronic gout involving toe of left foot without tophus, unspecified cause M1A. 9XX0 274.02 allopurinol (ZYLOPRIM) 100 mg tablet      colchicine (MITIGARE) 0.6 mg capsule      URIC ACID      METABOLIC PANEL, COMPREHENSIVE   3. Penile discharge R36.9 788.7 CT/NG/T.VAGINALIS AMPLIFICATION      azithromycin (ZITHROMAX) 250 mg tablet      cefixime (SUPRAX) 400 mg capsule   4. Elevated blood pressure reading R03.0 796.2      Follow-up and Dispositions    · Return in about 4 weeks (around 12/10/2019) for blood pressure, fasting labs. Urine dip normal   Treat empirically for STI per patient's request    Discussed lifestyle management of elevated blood pressure     lab results and schedule of future lab studies reviewed with patient  reviewed diet, exercise and weight control  reviewed     Reviewed medications and side effects in detail    Patient voices understanding and acceptance of this advice and will call back if any further questions or concerns. An After Visit Summary was printed and given to the patient.

## 2019-11-14 LAB
ALBUMIN SERPL-MCNC: 4.5 G/DL (ref 3.5–5.5)
ALBUMIN/GLOB SERPL: 1.5 {RATIO} (ref 1.2–2.2)
ALP SERPL-CCNC: 66 IU/L (ref 39–117)
ALT SERPL-CCNC: 36 IU/L (ref 0–44)
AST SERPL-CCNC: 27 IU/L (ref 0–40)
BILIRUB SERPL-MCNC: 0.6 MG/DL (ref 0–1.2)
BUN SERPL-MCNC: 14 MG/DL (ref 6–24)
BUN/CREAT SERPL: 12 (ref 9–20)
C TRACH RRNA SPEC QL NAA+PROBE: NEGATIVE
CALCIUM SERPL-MCNC: 9.4 MG/DL (ref 8.7–10.2)
CHLORIDE SERPL-SCNC: 97 MMOL/L (ref 96–106)
CO2 SERPL-SCNC: 29 MMOL/L (ref 20–29)
CREAT SERPL-MCNC: 1.13 MG/DL (ref 0.76–1.27)
GLOBULIN SER CALC-MCNC: 3.1 G/DL (ref 1.5–4.5)
GLUCOSE SERPL-MCNC: 85 MG/DL (ref 65–99)
N GONORRHOEA RRNA SPEC QL NAA+PROBE: NEGATIVE
POTASSIUM SERPL-SCNC: 4.1 MMOL/L (ref 3.5–5.2)
PROT SERPL-MCNC: 7.6 G/DL (ref 6–8.5)
SODIUM SERPL-SCNC: 140 MMOL/L (ref 134–144)
T VAGINALIS DNA SPEC QL NAA+PROBE: NEGATIVE
URATE SERPL-MCNC: 7.6 MG/DL (ref 3.7–8.6)

## 2019-11-18 NOTE — PROGRESS NOTES
Pt informed of lab results along with providers recommendations. Understanding voiced by Pt. No further actions required at this time.

## 2020-03-21 DIAGNOSIS — M1A.9XX0 CHRONIC GOUT INVOLVING TOE OF LEFT FOOT WITHOUT TOPHUS, UNSPECIFIED CAUSE: ICD-10-CM

## 2020-03-23 RX ORDER — ALLOPURINOL 100 MG/1
TABLET ORAL
Qty: 60 TAB | Refills: 0 | Status: SHIPPED | OUTPATIENT
Start: 2020-03-23 | End: 2020-06-15

## 2020-03-23 RX ORDER — COLCHICINE 0.6 MG/1
CAPSULE ORAL
Qty: 30 CAP | Refills: 0 | Status: SHIPPED | OUTPATIENT
Start: 2020-03-23 | End: 2020-06-15

## 2020-05-26 ENCOUNTER — VIRTUAL VISIT (OUTPATIENT)
Dept: INTERNAL MEDICINE CLINIC | Age: 45
End: 2020-05-26

## 2020-05-26 VITALS — WEIGHT: 240 LBS | BODY MASS INDEX: 34.44 KG/M2

## 2020-05-26 DIAGNOSIS — M1A.9XX0 CHRONIC GOUT INVOLVING TOE OF LEFT FOOT WITHOUT TOPHUS, UNSPECIFIED CAUSE: ICD-10-CM

## 2020-05-26 RX ORDER — COLCHICINE 0.6 MG/1
CAPSULE ORAL
Qty: 30 CAP | Refills: 3 | Status: CANCELLED | OUTPATIENT
Start: 2020-05-26

## 2020-05-26 RX ORDER — ALLOPURINOL 100 MG/1
TABLET ORAL
Qty: 60 TAB | Refills: 3 | Status: CANCELLED | OUTPATIENT
Start: 2020-05-26

## 2020-05-26 NOTE — PROGRESS NOTES
[phone]  Could possibly have had STD exposure per pt  No BP readings     Pt got on [employer] and then got off website before provider joined video chat. I called pt again to have him get back on the website. He stated he would. Pt was never seen on website. I have tried calling him multi. Times with no answer and unable to LVM. VM full. Chief Complaint   Patient presents with    Dysuria     x6 days. frequency, burning. 1. Have you been to the ER, urgent care clinic since your last visit? Hospitalized since your last visit? No    2. Have you seen or consulted any other health care providers outside of the [address] [place] [address] since your last visit? Include any pap smears or colon screening. No  Health Maintenance Due   Topic Date Due    DTaP/Tdap/Td series (1 - Tdap) 04/23/1994     3 most recent PHQ Screens 11/12/2019   Little interest or pleasure in doing things Not at all   Feeling down, depressed, irritable, or hopeless Not at all   Total Score PHQ 2 0     Recent Travel Screening and Travel History documentation     Travel Screening       Question Response     In the last month, have you been in contact with someone who was confirmed or suspected to have Coronavirus / COVID-19? No / Unsure     Do you have any of the following symptoms? None of these     Have you traveled internationally in the last month?  No      Travel History   Travel since 04/26/20     No documented travel since 04/26/20

## 2020-06-13 DIAGNOSIS — M1A.9XX0 CHRONIC GOUT INVOLVING TOE OF LEFT FOOT WITHOUT TOPHUS, UNSPECIFIED CAUSE: ICD-10-CM

## 2020-06-15 RX ORDER — COLCHICINE 0.6 MG/1
CAPSULE ORAL
Qty: 30 CAP | Refills: 0 | Status: SHIPPED | OUTPATIENT
Start: 2020-06-15 | End: 2020-07-28

## 2020-06-15 RX ORDER — ALLOPURINOL 100 MG/1
TABLET ORAL
Qty: 60 TAB | Refills: 0 | Status: SHIPPED | OUTPATIENT
Start: 2020-06-15 | End: 2020-07-28

## 2020-06-15 NOTE — TELEPHONE ENCOUNTER
Pt called to get two refill's one on his Allopurinol 100 mg as well as his Mitigare 0.6 mg.Writer informed pt that his pharmacy sent us the request's over the weekend and that they are waiting on JUDIT Kang's approval.Pt was made aware of the 24-48 hr turn around time,pt state's that he is in a lot of pain at this time and has no medication and would like if JUDIT Sterling could approve it today.

## 2020-07-28 DIAGNOSIS — M1A.9XX0 CHRONIC GOUT INVOLVING TOE OF LEFT FOOT WITHOUT TOPHUS, UNSPECIFIED CAUSE: ICD-10-CM

## 2020-07-28 RX ORDER — ALLOPURINOL 100 MG/1
TABLET ORAL
Qty: 60 TAB | Refills: 0 | Status: SHIPPED | OUTPATIENT
Start: 2020-07-28 | End: 2020-09-21

## 2020-07-28 RX ORDER — COLCHICINE 0.6 MG/1
CAPSULE ORAL
Qty: 30 CAP | Refills: 0 | Status: SHIPPED | OUTPATIENT
Start: 2020-07-28 | End: 2020-10-02

## 2020-09-16 ENCOUNTER — OFFICE VISIT (OUTPATIENT)
Dept: INTERNAL MEDICINE CLINIC | Age: 45
End: 2020-09-16

## 2020-09-16 VITALS
DIASTOLIC BLOOD PRESSURE: 80 MMHG | TEMPERATURE: 98.7 F | BODY MASS INDEX: 37.22 KG/M2 | OXYGEN SATURATION: 96 % | RESPIRATION RATE: 18 BRPM | WEIGHT: 260 LBS | HEART RATE: 71 BPM | HEIGHT: 70 IN | SYSTOLIC BLOOD PRESSURE: 136 MMHG

## 2020-09-16 DIAGNOSIS — R30.0 DYSURIA: Primary | ICD-10-CM

## 2020-09-16 DIAGNOSIS — R82.90 ABNORMAL URINE FINDINGS: ICD-10-CM

## 2020-09-16 PROCEDURE — 81003 URINALYSIS AUTO W/O SCOPE: CPT | Performed by: NURSE PRACTITIONER

## 2020-09-16 PROCEDURE — 99213 OFFICE O/P EST LOW 20 MIN: CPT | Performed by: NURSE PRACTITIONER

## 2020-09-16 RX ORDER — AZITHROMYCIN 250 MG/1
1000 TABLET, FILM COATED ORAL
Qty: 4 TAB | Refills: 0 | Status: SHIPPED | OUTPATIENT
Start: 2020-09-16 | End: 2020-09-16

## 2020-09-16 RX ORDER — CEFIXIME 400 MG/1
400 CAPSULE ORAL ONCE
Qty: 1 CAP | Refills: 0 | Status: SHIPPED | OUTPATIENT
Start: 2020-09-16 | End: 2020-09-16

## 2020-09-16 NOTE — PROGRESS NOTES
Chief Complaint   Patient presents with    Dysuria     Pt c/o pain and burning during urination, brownish color x2 days.

## 2020-09-16 NOTE — PATIENT INSTRUCTIONS
Painful Urination (Dysuria): Care Instructions Your Care Instructions Burning pain with urination (dysuria) is a common symptom of a urinary tract infection or other urinary problems. The bladder may become inflamed. This can cause pain when the bladder fills and empties. You may also feel pain if the tube that carries urine from the bladder to the outside of the body (urethra) gets irritated or infected. Sexually transmitted infections (STIs) also may cause pain when you urinate. Sometimes the pain can be caused by things other than an infection. The urethra can be irritated by soaps, perfumes, or foreign objects in the urethra. Kidney stones can cause pain when they pass through the urethra. The cause may be hard to find. You may need tests. Treatment for painful urination depends on the cause. Follow-up care is a key part of your treatment and safety. Be sure to make and go to all appointments, and call your doctor if you are having problems. It's also a good idea to know your test results and keep a list of the medicines you take. How can you care for yourself at home? · Drink extra water for the next day or two. This will help make the urine less concentrated. (If you have kidney, heart, or liver disease and have to limit fluids, talk with your doctor before you increase the amount of fluids you drink.) · Avoid drinks that are carbonated or have caffeine. They can irritate the bladder. · Urinate often. Try to empty your bladder each time. For women: · Urinate right after you have sex. · After going to the bathroom, wipe from front to back. · Avoid douches, bubble baths, and feminine hygiene sprays. And avoid other feminine hygiene products that have deodorants. When should you call for help? Call your doctor now or seek immediate medical care if: 
  · You have new symptoms, such as fever, nausea, or vomiting.  
  · You have new or worse symptoms of a urinary problem. For example: ? You have blood or pus in your urine. ? You have chills or body aches. ? It hurts worse to urinate. ? You have groin or belly pain. ? You have pain in your back just below your rib cage (the flank area). Watch closely for changes in your health, and be sure to contact your doctor if you have any problems. Where can you learn more? Go to http://www.gray.com/ Enter E217 in the search box to learn more about \"Painful Urination (Dysuria): Care Instructions. \" Current as of: June 29, 2020               Content Version: 12.6 © 1269-5764 [employer]. Care instructions adapted under license by [employer] (which disclaims liability or warranty for this information). If you have questions about a medical condition or this instruction, always ask your healthcare professional. [address] any warranty or liability for your use of this information.

## 2020-09-16 NOTE — PROGRESS NOTES
Rhona Gee is a 50 y.o. male presents for acute care   HPI     Dysuria for 2 days  Denies penile discharge, hematuria   Similar symptoms in past, with negative work up for STI and urology evaluation unrevealing   Now believes he is allergic to feminine wash used by partner  Request to be treated as on last visit  He reports he eats spicy foods daily  Drinks a gallon of water daily   No new medication     Past Medical History:   Diagnosis Date    Hypertension        Allergies   Allergen Reactions    Rocephin [Ceftriaxone] Shortness of Breath       Current Outpatient Medications   Medication Sig    colchicine (MITIGARE) 0.6 mg capsule TAKE 2 CAPSULES BY MOUTH NOW AND THEN 1 CAPSULE 1 HOUR LATER*NEEDS APPT    allopurinoL (ZYLOPRIM) 100 mg tablet TAKE 2 TABLETS BY MOUTH EVERY DAY     No current facility-administered medications for this visit. Review of Systems   Constitutional: Negative. Eyes: Negative. Gastrointestinal: Negative. Genitourinary: Positive for dysuria. Negative for flank pain, frequency, hematuria and urgency. Objective  Visit Vitals  /80 (BP 1 Location: Right arm, BP Patient Position: Sitting)   Pulse 71   Temp 98.7 °F (37.1 °C) (Oral)   Resp 18   Ht 5' 10\" (1.778 m)   Wt 260 lb (117.9 kg)   SpO2 96%   BMI 37.31 kg/m²     Physical Exam  Constitutional:       General: He is not in acute distress. Appearance: Normal appearance. Skin:     General: Skin is dry. Findings: No lesion or rash. Assessment & Plan    ICD-10-CM ICD-9-CM    1. Dysuria  R30.0 788.1 AMB POC URINALYSIS DIP STICK AUTO W/O MICRO      CT/NG/T.VAGINALIS AMPLIFICATION      azithromycin (ZITHROMAX) 250 mg tablet      cefixime (Suprax) 400 mg capsule      CT/NG/T.VAGINALIS AMPLIFICATION   2. Abnormal urine findings  R82.90 791.9 CULTURE, URINE      CULTURE, URINE     Follow-up and Dispositions    · Return if symptoms worsen or fail to improve.        Urine dip;  trace leukocytes, +1 blood; urine culture ordered   Encouraged to reduce spicy foods, continue hydration, discuss concern with partner  lab results and schedule of future lab studies reviewed with patient  reviewed medications and side effects in detail    Patient voices understanding and acceptance of this advice and will call back if any further questions or concerns.   Mj Obregon, NP

## 2020-09-17 LAB
BACTERIA SPEC CULT: NORMAL
SERVICE CMNT-IMP: NORMAL

## 2020-09-19 LAB
C TRACH RRNA SPEC QL NAA+PROBE: NEGATIVE
N GONORRHOEA RRNA SPEC QL NAA+PROBE: NEGATIVE
SPECIMEN SOURCE: NORMAL
T VAGINALIS RRNA VAG QL NAA+PROBE: NEGATIVE

## 2020-09-20 DIAGNOSIS — M1A.9XX0 CHRONIC GOUT INVOLVING TOE OF LEFT FOOT WITHOUT TOPHUS, UNSPECIFIED CAUSE: ICD-10-CM

## 2020-09-21 RX ORDER — ALLOPURINOL 100 MG/1
TABLET ORAL
Qty: 60 TAB | Refills: 0 | Status: SHIPPED | OUTPATIENT
Start: 2020-09-21 | End: 2020-10-02

## 2020-10-02 DIAGNOSIS — M1A.9XX0 CHRONIC GOUT INVOLVING TOE OF LEFT FOOT WITHOUT TOPHUS, UNSPECIFIED CAUSE: ICD-10-CM

## 2020-10-02 RX ORDER — COLCHICINE 0.6 MG/1
CAPSULE ORAL
Qty: 30 CAP | Refills: 0 | Status: SHIPPED | OUTPATIENT
Start: 2020-10-02 | End: 2020-11-17

## 2020-10-02 RX ORDER — ALLOPURINOL 100 MG/1
TABLET ORAL
Qty: 60 TAB | Refills: 0 | Status: SHIPPED | OUTPATIENT
Start: 2020-10-02 | End: 2021-02-02

## 2020-11-17 DIAGNOSIS — M1A.9XX0 CHRONIC GOUT INVOLVING TOE OF LEFT FOOT WITHOUT TOPHUS, UNSPECIFIED CAUSE: ICD-10-CM

## 2020-11-17 RX ORDER — COLCHICINE 0.6 MG/1
CAPSULE ORAL
Qty: 30 CAP | Refills: 0 | Status: SHIPPED | OUTPATIENT
Start: 2020-11-17 | End: 2021-02-02

## 2020-12-03 ENCOUNTER — OFFICE VISIT (OUTPATIENT)
Dept: INTERNAL MEDICINE CLINIC | Age: 45
End: 2020-12-03
Payer: MEDICAID

## 2020-12-03 VITALS
SYSTOLIC BLOOD PRESSURE: 173 MMHG | HEART RATE: 71 BPM | HEIGHT: 70 IN | RESPIRATION RATE: 18 BRPM | DIASTOLIC BLOOD PRESSURE: 98 MMHG | OXYGEN SATURATION: 98 % | BODY MASS INDEX: 37.38 KG/M2 | WEIGHT: 261.13 LBS | TEMPERATURE: 98.7 F

## 2020-12-03 DIAGNOSIS — I10 ESSENTIAL HYPERTENSION: Primary | ICD-10-CM

## 2020-12-03 DIAGNOSIS — R30.0 DYSURIA: ICD-10-CM

## 2020-12-03 LAB
BILIRUB UR QL STRIP: NEGATIVE
GLUCOSE UR-MCNC: NEGATIVE MG/DL
KETONES P FAST UR STRIP-MCNC: NEGATIVE MG/DL
PH UR STRIP: 6.5 [PH] (ref 4.6–8)
PROT UR QL STRIP: NEGATIVE
SP GR UR STRIP: 1.03 (ref 1–1.03)
UA UROBILINOGEN AMB POC: NORMAL (ref 0.2–1)
URINALYSIS CLARITY POC: CLEAR
URINALYSIS COLOR POC: YELLOW
URINE BLOOD POC: NORMAL
URINE LEUKOCYTES POC: NORMAL
URINE NITRITES POC: NEGATIVE

## 2020-12-03 PROCEDURE — 99214 OFFICE O/P EST MOD 30 MIN: CPT | Performed by: NURSE PRACTITIONER

## 2020-12-03 PROCEDURE — 81003 URINALYSIS AUTO W/O SCOPE: CPT | Performed by: NURSE PRACTITIONER

## 2020-12-03 RX ORDER — AMLODIPINE BESYLATE 5 MG/1
5 TABLET ORAL DAILY
Qty: 30 TAB | Refills: 3 | Status: SHIPPED | OUTPATIENT
Start: 2020-12-03 | End: 2021-12-18

## 2020-12-03 RX ORDER — AZITHROMYCIN 250 MG/1
1000 TABLET, FILM COATED ORAL ONCE
Qty: 4 TAB | Refills: 0 | Status: SHIPPED | OUTPATIENT
Start: 2020-12-03 | End: 2020-12-03

## 2020-12-03 RX ORDER — CEFIXIME 400 MG/1
400 CAPSULE ORAL ONCE
Qty: 1 CAP | Refills: 0 | Status: SHIPPED | OUTPATIENT
Start: 2020-12-03 | End: 2020-12-03

## 2020-12-03 NOTE — PROGRESS NOTES
Subjective  Radha Mcdaniel is a 50 y.o. male presents for acute care   HPI     Slight headache  Reports  he recently ate salty ham  History of hypertension. Controlled off medication for years     2 days ago had unprotected intercourse with ex  Developed dysuria and nausea same day   Convinced he has a STI  Denies penile discharge, groin pain or swelling   History of dysuria, prostatitis       Past Medical History:   Diagnosis Date    Hypertension        Current Outpatient Medications   Medication Sig    amLODIPine (NORVASC) 5 mg tablet Take 1 Tab by mouth daily.  colchicine (MITIGARE) 0.6 mg capsule TAKE 2 CAPSULES BY MOUTH NOW AND THEN 1 CAPSULE 1 HOUR LATER*NEEDS APPT    allopurinoL (ZYLOPRIM) 100 mg tablet TAKE 2 TABLETS BY MOUTH EVERY DAY     No current facility-administered medications for this visit. Allergies   Allergen Reactions    Rocephin [Ceftriaxone] Shortness of Breath           Review of Systems   Constitutional: Negative. Genitourinary: Positive for dysuria. Negative for flank pain, frequency, hematuria and urgency. Skin: Negative for itching and rash. Neurological: Positive for headaches. Negative for dizziness. Objective  Visit Vitals  BP (!) 173/98 (BP 1 Location: Right arm, BP Patient Position: Sitting)   Pulse 71   Temp 98.7 °F (37.1 °C)   Resp 18   Ht 5' 10\" (1.778 m)   Wt 261 lb 2 oz (118.4 kg)   SpO2 98%   BMI 37.47 kg/m²     Physical Exam  Cardiovascular:      Pulses: Normal pulses. Heart sounds: Normal heart sounds. Pulmonary:      Effort: Pulmonary effort is normal.      Breath sounds: Normal breath sounds. Abdominal:      General: Bowel sounds are normal. There is no distension. Palpations: There is no mass. Tenderness: There is no abdominal tenderness. There is no right CVA tenderness. Hernia: No hernia is present. Neurological:      General: No focal deficit present.       Mental Status: He is alert and oriented to person, place, and time.   Psychiatric:         Attention and Perception: Attention normal.         Mood and Affect: Mood is anxious. Speech: Speech normal.         Behavior: Behavior normal.         Cognition and Memory: Cognition normal.          Assessment & Plan    ICD-10-CM ICD-9-CM    1. Essential hypertension  I10 401.9 amLODIPine (NORVASC) 5 mg tablet   2. Dysuria  R30.0 788.1 AMB POC URINALYSIS DIP STICK AUTO W/O MICRO      URINALYSIS W/ REFLEX CULTURE      CT/NG/T.VAGINALIS AMPLIFICATION      azithromycin (ZITHROMAX) 250 mg tablet      cefixime (Suprax) 400 mg capsule      CT/NG/T.VAGINALIS AMPLIFICATION      URINALYSIS W/ REFLEX CULTURE     Follow-up and Dispositions    · Return in about 4 weeks (around 12/31/2020) for htn. Urine dip 3+ leukocytes  Discussed likely STI vs UTI  He requests empiric therapy, reports ADRs to Rocephin  Strongly encouraged safe sex    Hypertension. Restart medical management. Reviewed lifestyle  management.  Encouraged to monitor      lab results and schedule of future lab studies reviewed with patient  reviewed medications and side effects in detail  Yanelis Mccord NP

## 2020-12-03 NOTE — PATIENT INSTRUCTIONS
Painful Urination (Dysuria): Care Instructions  Your Care Instructions  Burning pain with urination (dysuria) is a common symptom of a urinary tract infection or other urinary problems. The bladder may become inflamed. This can cause pain when the bladder fills and empties. You may also feel pain if the tube that carries urine from the bladder to the outside of the body (urethra) gets irritated or infected. Sexually transmitted infections (STIs) also may cause pain when you urinate. Sometimes the pain can be caused by things other than an infection. The urethra can be irritated by soaps, perfumes, or foreign objects in the urethra. Kidney stones can cause pain when they pass through the urethra. The cause may be hard to find. You may need tests. Treatment for painful urination depends on the cause. Follow-up care is a key part of your treatment and safety. Be sure to make and go to all appointments, and call your doctor if you are having problems. It's also a good idea to know your test results and keep a list of the medicines you take. How can you care for yourself at home? · Drink extra water for the next day or two. This will help make the urine less concentrated. (If you have kidney, heart, or liver disease and have to limit fluids, talk with your doctor before you increase the amount of fluids you drink.)  · Avoid drinks that are carbonated or have caffeine. They can irritate the bladder. · Urinate often. Try to empty your bladder each time. For women:  · Urinate right after you have sex. · After going to the bathroom, wipe from front to back. · Avoid douches, bubble baths, and feminine hygiene sprays. And avoid other feminine hygiene products that have deodorants. When should you call for help? Call your doctor now or seek immediate medical care if:    · You have new symptoms, such as fever, nausea, or vomiting.     · You have new or worse symptoms of a urinary problem. For example:  ?  You have blood or pus in your urine. ? You have chills or body aches. ? It hurts worse to urinate. ? You have groin or belly pain. ? You have pain in your back just below your rib cage (the flank area). Watch closely for changes in your health, and be sure to contact your doctor if you have any problems. Where can you learn more? Go to http://www.gray.com/  Enter H814 in the search box to learn more about \"Painful Urination (Dysuria): Care Instructions. \"  Current as of: June 29, 2020               Content Version: 12.6  © 7555-4417 [employer]. Care instructions adapted under license by [employer] (which disclaims liability or warranty for this information). If you have questions about a medical condition or this instruction, always ask your healthcare professional. [address] any warranty or liability for your use of this information. Starting a Weight Loss Plan: Care Instructions  Your Care Instructions     If you are thinking about losing weight, it can be hard to know where to start. Your doctor can help you set up a weight loss plan that best meets your needs. You may want to take a class on nutrition or exercise, or join a weight loss support group. If you have questions about how to make changes to your eating or exercise habits, ask your doctor about seeing a registered dietitian or an exercise specialist.  It can be a big challenge to lose weight. But you do not have to make huge changes at once. Make small changes, and stick with them. When those changes become habit, add a few more changes. If you do not think you are ready to make changes right now, try to pick a date in the future. Make an appointment to see your doctor to discuss whether the time is right for you to start a plan. Follow-up care is a key part of your treatment and safety.  Be sure to make and go to all appointments, and call your doctor if you are having problems. It's also a good idea to know your test results and keep a list of the medicines you take. How can you care for yourself at home? · Set realistic goals. Many people expect to lose much more weight than is likely. A weight loss of 5% to 10% of your body weight may be enough to improve your health. · Get family and friends involved to provide support. Talk to them about why you are trying to lose weight, and ask them to help. They can help by participating in exercise and having meals with you, even if they may be eating something different. · Find what works best for you. If you do not have time or do not like to cook, a program that offers meal replacement bars or shakes may be better for you. Or if you like to prepare meals, finding a plan that includes daily menus and recipes may be best.  · Ask your doctor about other health professionals who can help you achieve your weight loss goals. ? A dietitian can help you make healthy changes in your diet. ? An exercise specialist or  can help you develop a safe and effective exercise program.  ? A counselor or psychiatrist can help you cope with issues such as depression, anxiety, or family problems that can make it hard to focus on weight loss. · Consider joining a support group for people who are trying to lose weight. Your doctor can suggest groups in your area. Where can you learn more? Go to http://www.gray.com/  Enter U357 in the search box to learn more about \"Starting a Weight Loss Plan: Care Instructions. \"  Current as of: December 11, 2019               Content Version: 12.6  © 4877-5671 [employer]. Care instructions adapted under license by [employer] (which disclaims liability or warranty for this information).  If you have questions about a medical condition or this instruction, always ask your healthcare professional. Edna Morfin disclaims any warranty or liability for your use of this information. Learning About High Blood Pressure  What is high blood pressure? Blood pressure is a measure of how hard the blood pushes against the walls of your arteries. It's normal for blood pressure to go up and down throughout the day. But if it stays up, you have high blood pressure. Another name for high blood pressure is hypertension. Two numbers tell you your blood pressure. The first number is the systolic pressure (top number). It shows how hard the blood pushes when your heart is pumping. The second number is the diastolic pressure (bottom number). It shows how hard the blood pushes between heartbeats, when your heart is relaxed and filling with blood. Your doctor will give you a goal for your blood pressure based on your health and your age. High blood pressure (hypertension) means that the top number stays high, or the bottom number stays high, or both. High blood pressure increases the risk of stroke, heart attack, and other problems. What happens when you have high blood pressure? · Blood flows through your arteries with too much force. Over time, this can damage the heart and the walls of your arteries. But you can't feel it. High blood pressure usually doesn't cause symptoms. · High blood pressure makes your heart work harder. And that can lead to heart failure, which means your heart doesn't pump as much blood as your body needs. · Fat and calcium start to build up in your arteries. This buildup is called hardening of the arteries. It can cause many problems including a heart attack and stroke. · Arteries also carry blood and oxygen to organs like your eyes, kidneys, and brain. If high blood pressure damages those arteries, it can lead to vision loss, kidney disease, stroke, and a higher risk of dementia. How can you prevent high blood pressure? · Stay at a healthy weight.   · Try to limit how much sodium you eat to less than 2,300 milligrams (mg) a day. If you limit your sodium to 1,500 mg a day, you can lower your blood pressure even more. ? Buy foods that are labeled \"unsalted,\" \"sodium-free,\" or \"low-sodium. \" Foods labeled \"reduced-sodium\" and \"light sodium\" may still have too much sodium. ? Flavor your food with garlic, lemon juice, onion, vinegar, herbs, and spices instead of salt. Do not use soy sauce, steak sauce, onion salt, garlic salt, mustard, or ketchup on your food. ? Use less salt (or none) when recipes call for it. You can often use half the salt a recipe calls for without losing flavor. · Be physically active. Get at least 30 minutes of exercise on most days of the week. Walking is a good choice. You also may want to do other activities, such as running, swimming, cycling, or playing tennis or team sports. · Limit alcohol to 2 drinks a day for men and 1 drink a day for women. · Eat plenty of fruits, vegetables, and low-fat dairy products. Eat less saturated and total fats. How is high blood pressure treated? · Your doctor will suggest making lifestyle changes to help your heart. For example, your doctor may ask you to eat healthy foods, quit smoking, lose extra weight, and be more active. · If lifestyle changes don't help enough, your doctor may recommend that you take medicine. · When blood pressure is very high, medicines are needed to lower it. Follow-up care is a key part of your treatment and safety. Be sure to make and go to all appointments, and call your doctor if you are having problems. It's also a good idea to know your test results and keep a list of the medicines you take. Where can you learn more? Go to http://www.Solairedirect.com/  Enter P501 in the search box to learn more about \"Learning About High Blood Pressure. \"  Current as of: December 16, 2019               Content Version: 12.6  © 4314-2301 [employer].    Care instructions adapted under license by [employer] (which disclaims liability or warranty for this information). If you have questions about a medical condition or this instruction, always ask your healthcare professional. [address] any warranty or liability for your use of this information. Home Blood Pressure Test: About This Test  What is it? A home blood pressure test allows you to keep track of your blood pressure at home. Blood pressure is a measure of the force of blood against the walls of your arteries. Blood pressure readings include two numbers, such as 130/80 (say \"130 over 80\"). The first number is the systolic pressure. The second number is the diastolic pressure. Why is this test done? You may do this test at home to:  · Find out if you have high blood pressure. · Track your blood pressure if you have high blood pressure. · Track how well medicine is working to reduce high blood pressure. · Check how lifestyle changes, such as weight loss and exercise, are affecting blood pressure. How do you prepare for the test?  For at least 30 minutes before you take your blood pressure, don't exercise or use caffeine, tobacco, or medicines that raise blood pressure. Take your blood pressure while you feel comfortable and relaxed. Sit quietly with both feet on the floor for at least 5 minutes before the test.  How is the test done? · Sit with your arm slightly bent and resting on a table so that your upper arm is at the same level as your heart. · Roll up your sleeve or take off your shirt to expose your upper arm. · Wrap the blood pressure cuff around your upper arm so that the lower edge of the cuff is about 1 inch above the bend of your elbow. Proceed with the following steps depending on if you are using an automatic or manual pressure monitor.   Automatic blood pressure monitors  · Press the on/off button on the automatic monitor and wait until the ready-to-measure \"heart\" symbol appears next to zero in the display window. · Press the start button. The cuff will inflate and deflate by itself. · Your blood pressure numbers will appear on the screen. · Write your numbers in your log book, along with the date and time. Manual blood pressure monitors  · Place the earpieces of a stethoscope in your ears, and place the bell of the stethoscope over the artery, just below the cuff. · Close the valve on the rubber inflating bulb. · Squeeze the bulb rapidly with your opposite hand to inflate the cuff until the dial or column of mercury reads about 30 mm Hg higher than your usual systolic pressure. If you do not know your usual pressure, inflate the cuff to 210 mm Hg or until the pulse at your wrist disappears. · Open the pressure valve just slightly by twisting or pressing the valve on the bulb. · As you watch the pressure slowly fall, note the level on the dial at which you first start to hear a pulsing or tapping sound through the stethoscope. This is your systolic blood pressure. · Continue letting the air out slowly. The sounds will become muffled and will finally disappear. Note the pressure when the sounds completely disappear. This is your diastolic blood pressure. Let out all the remaining air. · Write your numbers in your log book, along with the date and time. Follow-up care is a key part of your treatment and safety. Be sure to make and go to all appointments, and call your doctor if you are having problems. It's also a good idea to keep a list of the medicines you take. Where can you learn more? Go to http://www.Commissioner.com/  Enter C427 in the search box to learn more about \"Home Blood Pressure Test: About This Test.\"  Current as of: December 16, 2019               Content Version: 12.6  © 3807-1683 [employer]. Care instructions adapted under license by [employer] (which disclaims liability or warranty for this information).  If you have questions about a medical condition or this instruction, always ask your healthcare professional. [place] [address] any warranty or liability for your use of this information.

## 2020-12-03 NOTE — PROGRESS NOTES
RM 9    Chief Complaint   Patient presents with    Urinary Pain     Sharp pains with urination     1. Have you been to the ER, urgent care clinic since your last visit? Hospitalized since your last visit? No    2. Have you seen or consulted any other health care providers outside of the [address] [place] [address] since your last visit? Include any pap smears or colon screening. No    Health Maintenance Due   Topic Date Due    DTaP/Tdap/Td series (1 - Tdap) 04/23/1994    Flu Vaccine (1) 09/01/2020       Learning Assessment 8/10/2015   PRIMARY LEARNER Patient   HIGHEST LEVEL OF EDUCATION - PRIMARY LEARNER  GRADUATED HIGH SCHOOL OR GED   BARRIERS PRIMARY LEARNER NONE   PRIMARY LANGUAGE ENGLISH   LEARNER PREFERENCE PRIMARY READING     DEMONSTRATION   ANSWERED BY self   RELATIONSHIP SELF       Patient declined flu vaccine    AVS  education, follow up, and recommendations provided and addressed with patient.   services used to advise patient n

## 2020-12-05 PROBLEM — R36.9 PENILE DISCHARGE: Status: ACTIVE | Noted: 2020-12-05

## 2020-12-05 LAB
APPEARANCE UR: CLEAR
BACTERIA SPEC CULT: NORMAL
BACTERIA URNS QL MICRO: NEGATIVE /HPF
BILIRUB UR QL: NEGATIVE
C TRACH RRNA SPEC QL NAA+PROBE: NEGATIVE
COLOR UR: ABNORMAL
EPITH CASTS URNS QL MICRO: ABNORMAL /LPF
GLUCOSE UR STRIP.AUTO-MCNC: NEGATIVE MG/DL
HGB UR QL STRIP: ABNORMAL
HYALINE CASTS URNS QL MICRO: ABNORMAL /LPF (ref 0–5)
KETONES UR QL STRIP.AUTO: NEGATIVE MG/DL
LEUKOCYTE ESTERASE UR QL STRIP.AUTO: ABNORMAL
N GONORRHOEA RRNA SPEC QL NAA+PROBE: NEGATIVE
NITRITE UR QL STRIP.AUTO: NEGATIVE
PH UR STRIP: 6.5 [PH] (ref 5–8)
PROT UR STRIP-MCNC: NEGATIVE MG/DL
RBC #/AREA URNS HPF: ABNORMAL /HPF (ref 0–5)
SERVICE CMNT-IMP: NORMAL
SP GR UR REFRACTOMETRY: 1.01 (ref 1–1.03)
SPECIMEN SOURCE: ABNORMAL
T VAGINALIS RRNA VAG QL NAA+PROBE: POSITIVE
UA: UC IF INDICATED,UAUC: ABNORMAL
UROBILINOGEN UR QL STRIP.AUTO: 0.2 EU/DL (ref 0.2–1)
WBC URNS QL MICRO: ABNORMAL /HPF (ref 0–4)

## 2020-12-11 DIAGNOSIS — A59.9 TRICHOMONAS INFECTION: Primary | ICD-10-CM

## 2020-12-11 RX ORDER — METRONIDAZOLE 500 MG/1
2000 TABLET ORAL ONCE
Qty: 4 TAB | Refills: 0 | Status: SHIPPED | OUTPATIENT
Start: 2020-12-11 | End: 2020-12-11

## 2020-12-11 NOTE — PROGRESS NOTES
Please advise patient he is positive for trichomonas a sexually transmitted infection. He and partner need to be treated. Antibiotic sent to pharmacy.  He cannot drink alcohol while on antibiotic and for 24 hours after

## 2021-02-01 DIAGNOSIS — M1A.9XX0 CHRONIC GOUT INVOLVING TOE OF LEFT FOOT WITHOUT TOPHUS, UNSPECIFIED CAUSE: ICD-10-CM

## 2021-02-02 DIAGNOSIS — M1A.9XX0 CHRONIC GOUT INVOLVING TOE OF LEFT FOOT WITHOUT TOPHUS, UNSPECIFIED CAUSE: ICD-10-CM

## 2021-02-02 RX ORDER — ALLOPURINOL 100 MG/1
TABLET ORAL
Qty: 60 TAB | Refills: 0 | Status: SHIPPED | OUTPATIENT
Start: 2021-02-02 | End: 2021-05-19

## 2021-02-02 RX ORDER — COLCHICINE 0.6 MG/1
CAPSULE ORAL
Qty: 30 CAP | Refills: 0 | Status: SHIPPED | OUTPATIENT
Start: 2021-02-02 | End: 2021-05-12

## 2021-05-10 DIAGNOSIS — M1A.9XX0 CHRONIC GOUT INVOLVING TOE OF LEFT FOOT WITHOUT TOPHUS, UNSPECIFIED CAUSE: ICD-10-CM

## 2021-05-12 RX ORDER — COLCHICINE 0.6 MG/1
CAPSULE ORAL
Qty: 30 CAP | Refills: 0 | Status: SHIPPED | OUTPATIENT
Start: 2021-05-12 | End: 2021-07-10

## 2021-05-18 DIAGNOSIS — M1A.9XX0 CHRONIC GOUT INVOLVING TOE OF LEFT FOOT WITHOUT TOPHUS, UNSPECIFIED CAUSE: ICD-10-CM

## 2021-05-19 RX ORDER — ALLOPURINOL 100 MG/1
TABLET ORAL
Qty: 60 TABLET | Refills: 0 | Status: SHIPPED | OUTPATIENT
Start: 2021-05-19 | End: 2021-07-10

## 2021-06-07 ENCOUNTER — OFFICE VISIT (OUTPATIENT)
Dept: INTERNAL MEDICINE CLINIC | Age: 46
End: 2021-06-07
Payer: MEDICAID

## 2021-06-07 VITALS
OXYGEN SATURATION: 97 % | DIASTOLIC BLOOD PRESSURE: 85 MMHG | HEART RATE: 70 BPM | WEIGHT: 257 LBS | HEIGHT: 70 IN | BODY MASS INDEX: 36.79 KG/M2 | SYSTOLIC BLOOD PRESSURE: 142 MMHG | TEMPERATURE: 98.4 F

## 2021-06-07 DIAGNOSIS — R03.0 ELEVATED BLOOD PRESSURE READING: ICD-10-CM

## 2021-06-07 DIAGNOSIS — S39.012A STRAIN OF LUMBAR REGION, INITIAL ENCOUNTER: Primary | ICD-10-CM

## 2021-06-07 PROCEDURE — 99214 OFFICE O/P EST MOD 30 MIN: CPT | Performed by: INTERNAL MEDICINE

## 2021-06-07 RX ORDER — HYDROCODONE BITARTRATE AND ACETAMINOPHEN 5; 325 MG/1; MG/1
1 TABLET ORAL
COMMUNITY
Start: 2021-06-02 | End: 2022-02-23

## 2021-06-07 RX ORDER — MELOXICAM 15 MG/1
15 TABLET ORAL DAILY
Qty: 14 TABLET | Refills: 0 | Status: SHIPPED | OUTPATIENT
Start: 2021-06-07 | End: 2021-06-21

## 2021-06-07 RX ORDER — SILDENAFIL 100 MG/1
TABLET, FILM COATED ORAL
COMMUNITY
Start: 2021-04-11

## 2021-06-07 RX ORDER — DICLOFENAC SODIUM 25 MG/1
25 TABLET, DELAYED RELEASE ORAL
COMMUNITY
Start: 2021-06-02 | End: 2021-06-07

## 2021-06-07 RX ORDER — AMOXICILLIN 500 MG/1
CAPSULE ORAL
COMMUNITY
Start: 2021-03-22 | End: 2022-02-23
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
## 2021-06-07 NOTE — LETTER
NOTIFICATION RETURN TO WORK / SCHOOL 
 
6/7/2021 12:19 PM 
 
Mr. Fiorella Bates [address] [facility] [address] To Whom It May Concern: 
 
Fiorella Bates is currently under the care of Stephanie. He will return to work/school on: 6/9/2021. If there are questions or concerns please have the patient contact our office.  
 
 
 
Sincerely, 
 
 
Abdirashid Hua MD

## 2021-06-07 NOTE — PATIENT INSTRUCTIONS
For back pain 
 - your symptoms are consistent with strain of lumbar muscles. - I recommend using stretching and heat back to help losen up the muscles. - Please take meloxciam medication to help reduce inflammation. - please schedule with physical therapy. In addition to the location provided in the referral. You can also schedule with any physical therapy group covered by your insurance. See attached list for possible locations. Back Strain: Care Instructions Overview A back strain happens when you overstretch, or pull, a muscle in your back. You may hurt your back in an accident or when you exercise or lift something. Sometimes you may not know how you hurt your back. Most back pain will get better with rest and time. You can take care of yourself at home to help your back heal. 
Follow-up care is a key part of your treatment and safety. Be sure to make and go to all appointments, and call your doctor if you are having problems. It's also a good idea to know your test results and keep a list of the medicines you take. How can you care for yourself at home? · Try to stay as active as you can, but stop or reduce any activity that causes pain. · Put ice or a cold pack on the sore muscle for 10 to 20 minutes at a time to stop swelling. Try this every 1 to 2 hours for 3 days (when you are awake) or until the swelling goes down. Put a thin cloth between the ice pack and your skin. · After 2 or 3 days, apply a heating pad on low or a warm cloth to your back. Some doctors suggest that you go back and forth between hot and cold treatments. · Take pain medicines exactly as directed. ? If the doctor gave you a prescription medicine for pain, take it as prescribed. ? If you are not taking a prescription pain medicine, ask your doctor if you can take an over-the-counter medicine. · Try sleeping on your side with a pillow between your legs.  Or put a pillow under your knees when you lie on your back. These measures can ease pain in your lower back. · Return to your usual level of activity slowly. When should you call for help? Call 911 anytime you think you may need emergency care. For example, call if: 
  · You are unable to move a leg at all. Call your doctor now or seek immediate medical care if: 
  · You have new or worse symptoms in your legs, belly, or buttocks. Symptoms may include: 
? Numbness or tingling. ? Weakness. ? Pain.  
  · You lose bladder or bowel control. Watch closely for changes in your health, and be sure to contact your doctor if: 
  · You have a fever, lose weight, or don't feel well.  
  · You are not getting better as expected. Where can you learn more? Go to http://www.gray.com/ Enter A137 in the search box to learn more about \"Back Strain: Care Instructions. \" Current as of: November 16, 2020               Content Version: 12.8 © 2006-2021 [employer]. Care instructions adapted under license by [employer] (which disclaims liability or warranty for this information). If you have questions about a medical condition or this instruction, always ask your healthcare professional. [address] any warranty or liability for your use of this information.
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
## 2021-06-17 ENCOUNTER — HOSPITAL ENCOUNTER (OUTPATIENT)
Dept: PHYSICAL THERAPY | Age: 46
Discharge: HOME | End: 2021-06-17
Attending: INTERNAL MEDICINE
Payer: MEDICAID

## 2021-06-17 DIAGNOSIS — S39.012A STRAIN OF LUMBAR REGION, INITIAL ENCOUNTER: ICD-10-CM

## 2021-06-17 PROCEDURE — 97161 PT EVAL LOW COMPLEX 20 MIN: CPT | Performed by: PHYSICAL THERAPIST
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
## 2021-06-28 ENCOUNTER — DOCUMENTATION ONLY (OUTPATIENT)
Dept: INTERNAL MEDICINE CLINIC | Age: 46
End: 2021-06-28

## 2021-06-28 NOTE — PROGRESS NOTES
Physical Therapy Adele Mullins, Plan of Care, Provider signed 6/18/2021, Fax confirmed 6/22/2021 to [phone], Confirmation fax scanned into [facility]

## 2021-07-01 ENCOUNTER — HOSPITAL ENCOUNTER (OUTPATIENT)
Dept: PHYSICAL THERAPY | Age: 46
Discharge: HOME | End: 2021-07-01
Attending: INTERNAL MEDICINE
Payer: MEDICAID

## 2021-07-01 PROCEDURE — 97014 ELECTRIC STIMULATION THERAPY: CPT | Performed by: PHYSICAL THERAPIST

## 2021-07-01 PROCEDURE — 97110 THERAPEUTIC EXERCISES: CPT | Performed by: PHYSICAL THERAPIST

## 2021-07-01 NOTE — PROGRESS NOTES
PT DAILY TREATMENT NOTE 2-15    Patient Name: Graham Harvey  Date:2021  : 1975  [x]  Patient  Verified  Payor: [address] / Plan: [address] [facility] / Product Type: Managed Care Medicaid /    In time: 7:55 A  Out time: 8:55 A  Total Treatment Time (min): 60 (45 timed)  Visit #:  2    Treatment Area: Strain of muscle, fascia and tendon of lower back, initial encounter [S39.012A]    SUBJECTIVE  Pain Level (0-10 scale): 5.5  Any medication changes, allergies to medications, adverse drug reactions, diagnosis change, or new procedure performed?: [x] No    [] Yes (see summary sheet for update)  Subjective functional status/changes:   [] No changes reported  Pt states that he still is having R sided lower back pain-- spasms that come and go. He has been working, not full time hours. He reports inc'd pain with prolonged standing. Good compliance with HEP-- he has used heat a few times at home, however no ice. He felt good with the ice after last visit.     OBJECTIVE    Modality rationale: decrease inflammation, decrease pain and increase tissue extensibility to improve the patients ability to perform daily chores, stand with dec'd symptoms   Min Type Additional Details      15 [x] Estim: []Att   [x]Unatt    []TENS instruct                  [x]IFC  []Premod   []NMES                     []Other:  []w/US   [x]w/ice   []w/heat  Position: supine LE's elevated  Location: lumbar       []  Traction: [] Cervical       []Lumbar                       [] Prone          []Supine                       []Intermittent   []Continuous Lbs:  [] before manual  [] after manual  []w/heat    []  Ice     []  Heat  []  Ice massage Position:  Location:      []  Vasopneumatic Device Pressure:       [] lo [] med [] hi   Temperature:      [x] Skin assessment post-treatment:  [x]intact []redness- no adverse reaction    []redness  adverse reaction:       45 min Therapeutic Exercise:  [x] See flow sheet : progressed per flow sheet   Rationale: increase ROM, increase strength, improve coordination, improve balance and increase proprioception to improve the patients ability to perform daily activities, stand/walk with dec'd symptoms     min Manual Therapy:     Rationale: With   [x] TE   [] TA   [] Neuro   [] SC   [] other: Patient Education: [x] Review HEP    [] Progressed/Changed HEP based on:   [] positioning   [] body mechanics   [] transfers   [] heat/ice application    [x] other: discussed importance of stretching, seated rest breaks when able while at work     Other Objective/Functional Measures: n/a     Pain Level (0-10 scale) post treatment: 0    ASSESSMENT/Changes in Function:   Linda addition of new exercises well today. Dec'd symptoms after PT visit today. Patient will continue to benefit from skilled PT services to modify and progress therapeutic interventions, address functional mobility deficits, address ROM deficits, address strength deficits, analyze and address soft tissue restrictions, analyze and cue movement patterns and analyze and modify body mechanics/ergonomics to attain remaining goals.      [x]  See Plan of Care  []  See progress note/recertification  []  See Discharge Summary         Progress towards goals / Updated goals:  NT    PLAN  []  Upgrade activities as tolerated     [x]  Continue plan of care  []  Update interventions per flow sheet       []  Discharge due to:_  []  Other:_      Cinthya Brooks, PT 7/1/2021

## 2021-07-07 ENCOUNTER — HOSPITAL ENCOUNTER (OUTPATIENT)
Dept: PHYSICAL THERAPY | Age: 46
Discharge: HOME | End: 2021-07-07
Attending: INTERNAL MEDICINE
Payer: MEDICAID

## 2021-07-07 PROCEDURE — 97110 THERAPEUTIC EXERCISES: CPT | Performed by: PHYSICAL THERAPY ASSISTANT

## 2021-07-07 NOTE — PROGRESS NOTES
PT DAILY TREATMENT NOTE 2-15    Patient Name: Ld Landeros  Date:2021  : 1975  [x]  Patient  Verified  Payor: [address] / Plan: [address] [facility] / Product Type: Managed Care Medicaid /    In time: 11:15 A  Out time: 12:00 PM  Total Treatment Time (min): 45 (30 timed)  Visit #:  3    Treatment Area: Strain of muscle, fascia and tendon of lower back, initial encounter [S39.012A]    SUBJECTIVE  Pain Level (0-10 scale): 0/10 \"Stiff\"  Any medication changes, allergies to medications, adverse drug reactions, diagnosis change, or new procedure performed?: [x] No    [] Yes (see summary sheet for update)  Subjective functional status/changes:   [] No changes reported  Pt states he has only had stiffness in his low back since last visit. States he needs to leave by 12 pm as he has another MD appointment to go to.     OBJECTIVE    Modality rationale: decrease inflammation, decrease pain and increase tissue extensibility to improve the patients ability to perform daily chores, stand with dec'd symptoms   Min Type Additional Details       [x] Estim: []Att   [x]Unatt    []TENS instruct                  [x]IFC  []Premod   []NMES                     []Other:  []w/US   [x]w/ice   []w/heat  Position: supine LE's elevated  Location: lumbar       []  Traction: [] Cervical       []Lumbar                       [] Prone          []Supine                       []Intermittent   []Continuous Lbs:  [] before manual  [] after manual  []w/heat   10 [x]  Ice     []  Heat  []  Ice massage Position:  Location:      []  Vasopneumatic Device Pressure:       [] lo [] med [] hi   Temperature:      [x] Skin assessment post-treatment:  [x]intact []redness- no adverse reaction    []redness  adverse reaction:       35 min Therapeutic Exercise:  [x] See flow sheet : progressed per flow sheet   Rationale: increase ROM, increase strength, improve coordination, improve balance and increase proprioception to improve the patients ability to perform daily activities, stand/walk with dec'd symptoms     min Manual Therapy:     Rationale: With   [x] TE   [] TA   [] Neuro   [] SC   [] other: Patient Education: [x] Review HEP    [] Progressed/Changed HEP based on:   [] positioning   [] body mechanics   [] transfers   [] heat/ice application    [x] other: educated patient on return to gym progression over the next couple weeks. Continued to encouraged light cardio, walking as long as it doesn't aggravate his R low back. Other Objective/Functional Measures: n/a     Pain Level (0-10 scale) post treatment: 0    ASSESSMENT/Changes in Function:   Modified session due to time constraints secondary to patient needing to leave for another MD appointment. Tolerated new exercises well today. Patient noting relief following stretches today. Patient will continue to benefit from skilled PT services to modify and progress therapeutic interventions, address functional mobility deficits, address ROM deficits, address strength deficits, analyze and address soft tissue restrictions, analyze and cue movement patterns and analyze and modify body mechanics/ergonomics to attain remaining goals.      [x]  See Plan of Care  []  See progress note/recertification  []  See Discharge Summary         Progress towards goals / Updated goals:  NT    PLAN  []  Upgrade activities as tolerated     [x]  Continue plan of care  []  Update interventions per flow sheet       []  Discharge due to:_  []  Other:_      Eh Licona, PTA 7/7/2021

## 2021-07-08 DIAGNOSIS — M1A.9XX0 CHRONIC GOUT INVOLVING TOE OF LEFT FOOT WITHOUT TOPHUS, UNSPECIFIED CAUSE: ICD-10-CM

## 2021-07-10 RX ORDER — COLCHICINE 0.6 MG/1
CAPSULE ORAL
Qty: 30 CAPSULE | Refills: 0 | Status: SHIPPED | OUTPATIENT
Start: 2021-07-10 | End: 2021-09-14

## 2021-07-10 RX ORDER — ALLOPURINOL 100 MG/1
TABLET ORAL
Qty: 60 TABLET | Refills: 0 | Status: SHIPPED | OUTPATIENT
Start: 2021-07-10 | End: 2021-09-14

## 2021-07-14 ENCOUNTER — APPOINTMENT (OUTPATIENT)
Dept: PHYSICAL THERAPY | Age: 46
End: 2021-07-14
Attending: INTERNAL MEDICINE
Payer: MEDICAID

## 2021-07-28 ENCOUNTER — HOSPITAL ENCOUNTER (OUTPATIENT)
Dept: PHYSICAL THERAPY | Age: 46
Discharge: HOME | End: 2021-07-28
Attending: INTERNAL MEDICINE
Payer: MEDICAID

## 2021-07-28 PROCEDURE — 97014 ELECTRIC STIMULATION THERAPY: CPT | Performed by: PHYSICAL THERAPIST

## 2021-07-28 PROCEDURE — 97110 THERAPEUTIC EXERCISES: CPT | Performed by: PHYSICAL THERAPIST

## 2021-07-28 NOTE — PROGRESS NOTES
PT DAILY TREATMENT NOTE 2-15    Patient Name: Cuate Morocho  Date:2021  : 1975  [x]  Patient  Verified  Payor: [address] / Plan:  [facility] / Product Type: Managed Care Medicaid /    In time:  A  Out time: 11:15 AM  Total Treatment Time (min): 60 (45 timed)  Visit #:  4    Treatment Area: Strain of muscle, fascia and tendon of lower back, initial encounter [S39.012A]    SUBJECTIVE  Pain Level (0-10 scale): 0/10 \"Stiff\"  Any medication changes, allergies to medications, adverse drug reactions, diagnosis change, or new procedure performed?: [x] No    [] Yes (see summary sheet for update)  Subjective functional status/changes:   [] No changes reported  \"no pain, the stiffness is still there\". He reports last week he was on vacation in [place], he was standing/walking a lot.  He states he did not do any of his PT exercises last week    OBJECTIVE    Modality rationale: decrease inflammation, decrease pain and increase tissue extensibility to improve the patients ability to perform daily chores, stand with dec'd symptoms   Min Type Additional Details      15 [x] Estim: []Att   [x]Unatt    []TENS instruct                  [x]IFC  []Premod   []NMES                     []Other:  []w/US   [x]w/ice   []w/heat  Position: supine LE's elevated  Location: lumbar       []  Traction: [] Cervical       []Lumbar                       [] Prone          []Supine                       []Intermittent   []Continuous Lbs:  [] before manual  [] after manual  []w/heat    [x]  Ice     []  Heat  []  Ice massage Position:  Location:      []  Vasopneumatic Device Pressure:       [] lo [] med [] hi   Temperature:      [x] Skin assessment post-treatment:  [x]intact []redness- no adverse reaction    []redness  adverse reaction:       45 min Therapeutic Exercise:  [x] See flow sheet : progressed per flow sheet   Rationale: increase ROM, increase strength, improve coordination, improve balance and increase proprioception to improve the patients ability to perform daily activities, stand/walk with dec'd symptoms     min Manual Therapy:     Rationale: With   [x] TE   [] TA   [] Neuro   [] SC   [] other: Patient Education: [x] Review HEP    [] Progressed/Changed HEP based on:   [] positioning   [] body mechanics   [] transfers   [] heat/ice application    [x] other: educated patient on return to gym progression over the next couple weeks. Continued to encouraged light cardio, walking as long as it doesn't aggravate his R low back. Other Objective/Functional Measures: n/a     Pain Level (0-10 scale) post treatment: 0    ASSESSMENT/Changes in Function:   Updated HEP handout today. Linda progression of exercises well today  Patient will continue to benefit from skilled PT services to modify and progress therapeutic interventions, address functional mobility deficits, address ROM deficits, address strength deficits, analyze and address soft tissue restrictions, analyze and cue movement patterns and analyze and modify body mechanics/ergonomics to attain remaining goals.      [x]  See Plan of Care  []  See progress note/recertification  []  See Discharge Summary         Progress towards goals / Updated goals:  NT    PLAN  []  Upgrade activities as tolerated     [x]  Continue plan of care  []  Update interventions per flow sheet       []  Discharge due to:_  []  Other:_      Roque Novak, PT 7/28/2021

## 2021-08-16 ENCOUNTER — APPOINTMENT (OUTPATIENT)
Dept: PHYSICAL THERAPY | Age: 46
End: 2021-08-16
Attending: INTERNAL MEDICINE

## 2021-08-20 ENCOUNTER — APPOINTMENT (OUTPATIENT)
Dept: PHYSICAL THERAPY | Age: 46
End: 2021-08-20
Attending: INTERNAL MEDICINE

## 2021-08-23 NOTE — ANCILLARY DISCHARGE INSTRUCTIONS
[facility]  [address]  [place], [address]  Phone: [phone]  Fax: [phone]    Discharge Summary  2-15    Patient name: Arsalan Lozano  : 1975  Provider#:[id number]  Referral source: Hadassah Shone, MD      Medical/Treatment Diagnosis: Strain of muscle, fascia and tendon of lower back, initial encounter [phone]     Prior Hospitalization: see medical history     Comorbidities: see evaluation  Prior Level of Function:see evaluation  Medications: Verified on Patient Summary List    Start of Care: 21      Onset Date:see evaluation   Visits from Start of Care: 4     Missed Visits: see CC  Reporting Period :  to 21    Summary of care:   Pt has either cancelled for failed to show for all future appointments. Unable to assess goals. D/C from PT.       RECOMMENDATIONS:  [x]Discontinue therapy: []Patient has reached or is progressing toward set goals      [x]Patient is non-compliant or has abdicated      []Due to lack of appreciable progress towards set [address] Marina Figueroa, PT 2021 5:14 PM

## 2021-08-25 ENCOUNTER — APPOINTMENT (OUTPATIENT)
Dept: PHYSICAL THERAPY | Age: 46
End: 2021-08-25
Attending: INTERNAL MEDICINE

## 2021-09-01 ENCOUNTER — APPOINTMENT (OUTPATIENT)
Dept: PHYSICAL THERAPY | Age: 46
End: 2021-09-01
Attending: INTERNAL MEDICINE

## 2021-09-08 ENCOUNTER — APPOINTMENT (OUTPATIENT)
Dept: PHYSICAL THERAPY | Age: 46
End: 2021-09-08
Attending: INTERNAL MEDICINE

## 2021-09-13 DIAGNOSIS — M1A.9XX0 CHRONIC GOUT INVOLVING TOE OF LEFT FOOT WITHOUT TOPHUS, UNSPECIFIED CAUSE: ICD-10-CM

## 2021-09-14 RX ORDER — ALLOPURINOL 100 MG/1
TABLET ORAL
Qty: 60 TABLET | Refills: 0 | Status: SHIPPED | OUTPATIENT
Start: 2021-09-14 | End: 2021-11-25

## 2021-09-14 RX ORDER — COLCHICINE 0.6 MG/1
CAPSULE ORAL
Qty: 30 CAPSULE | Refills: 0 | Status: SHIPPED | OUTPATIENT
Start: 2021-09-14 | End: 2021-11-25

## 2021-11-25 DIAGNOSIS — M1A.9XX0 CHRONIC GOUT INVOLVING TOE OF LEFT FOOT WITHOUT TOPHUS, UNSPECIFIED CAUSE: ICD-10-CM

## 2021-11-25 RX ORDER — COLCHICINE 0.6 MG/1
CAPSULE ORAL
Qty: 30 CAPSULE | Refills: 0 | Status: SHIPPED | OUTPATIENT
Start: 2021-11-25 | End: 2022-02-23 | Stop reason: SDUPTHER

## 2021-11-25 RX ORDER — ALLOPURINOL 100 MG/1
TABLET ORAL
Qty: 60 TABLET | Refills: 0 | Status: SHIPPED | OUTPATIENT
Start: 2021-11-25 | End: 2021-12-18

## 2022-02-23 ENCOUNTER — OFFICE VISIT (OUTPATIENT)
Dept: INTERNAL MEDICINE CLINIC | Age: 47
End: 2022-02-23
Payer: MEDICAID

## 2022-02-23 VITALS
WEIGHT: 260 LBS | SYSTOLIC BLOOD PRESSURE: 154 MMHG | OXYGEN SATURATION: 98 % | TEMPERATURE: 98.4 F | BODY MASS INDEX: 36.4 KG/M2 | HEIGHT: 71 IN | DIASTOLIC BLOOD PRESSURE: 93 MMHG | HEART RATE: 64 BPM

## 2022-02-23 DIAGNOSIS — S39.012A STRAIN OF LUMBAR REGION, INITIAL ENCOUNTER: ICD-10-CM

## 2022-02-23 DIAGNOSIS — I10 ESSENTIAL HYPERTENSION: ICD-10-CM

## 2022-02-23 DIAGNOSIS — K04.7 DENTAL INFECTION: Primary | ICD-10-CM

## 2022-02-23 DIAGNOSIS — M1A.9XX0 CHRONIC GOUT INVOLVING TOE OF LEFT FOOT WITHOUT TOPHUS, UNSPECIFIED CAUSE: ICD-10-CM

## 2022-02-23 LAB
ALBUMIN SERPL-MCNC: 4 G/DL (ref 3.5–5)
ALBUMIN/GLOB SERPL: 1.2 {RATIO} (ref 1.1–2.2)
ALP SERPL-CCNC: 77 U/L (ref 45–117)
ALT SERPL-CCNC: 37 U/L (ref 12–78)
ANION GAP SERPL CALC-SCNC: 3 MMOL/L (ref 5–15)
AST SERPL-CCNC: 21 U/L (ref 15–37)
BASOPHILS # BLD: 0.1 K/UL (ref 0–0.1)
BASOPHILS NFR BLD: 1 % (ref 0–1)
BILIRUB SERPL-MCNC: 0.5 MG/DL (ref 0.2–1)
BUN SERPL-MCNC: 14 MG/DL (ref 6–20)
BUN/CREAT SERPL: 14 (ref 12–20)
CALCIUM SERPL-MCNC: 9 MG/DL (ref 8.5–10.1)
CHLORIDE SERPL-SCNC: 105 MMOL/L (ref 97–108)
CO2 SERPL-SCNC: 30 MMOL/L (ref 21–32)
CREAT SERPL-MCNC: 1.03 MG/DL (ref 0.7–1.3)
DIFFERENTIAL METHOD BLD: ABNORMAL
EOSINOPHIL # BLD: 0.2 K/UL (ref 0–0.4)
EOSINOPHIL NFR BLD: 3 % (ref 0–7)
ERYTHROCYTE [DISTWIDTH] IN BLOOD BY AUTOMATED COUNT: 13.2 % (ref 11.5–14.5)
GLOBULIN SER CALC-MCNC: 3.4 G/DL (ref 2–4)
GLUCOSE SERPL-MCNC: 96 MG/DL (ref 65–100)
HCT VFR BLD AUTO: 50.6 % (ref 36.6–50.3)
HGB BLD-MCNC: 17 G/DL (ref 12.1–17)
IMM GRANULOCYTES # BLD AUTO: 0 K/UL (ref 0–0.04)
IMM GRANULOCYTES NFR BLD AUTO: 0 % (ref 0–0.5)
LYMPHOCYTES # BLD: 2.1 K/UL (ref 0.8–3.5)
LYMPHOCYTES NFR BLD: 34 % (ref 12–49)
MCH RBC QN AUTO: 30.8 PG (ref 26–34)
MCHC RBC AUTO-ENTMCNC: 33.6 G/DL (ref 30–36.5)
MCV RBC AUTO: 91.7 FL (ref 80–99)
MONOCYTES # BLD: 0.6 K/UL (ref 0–1)
MONOCYTES NFR BLD: 10 % (ref 5–13)
NEUTS SEG # BLD: 3.2 K/UL (ref 1.8–8)
NEUTS SEG NFR BLD: 52 % (ref 32–75)
NRBC # BLD: 0 K/UL (ref 0–0.01)
NRBC BLD-RTO: 0 PER 100 WBC
PLATELET # BLD AUTO: 261 K/UL (ref 150–400)
PMV BLD AUTO: 9.8 FL (ref 8.9–12.9)
POTASSIUM SERPL-SCNC: 4.3 MMOL/L (ref 3.5–5.1)
PROT SERPL-MCNC: 7.4 G/DL (ref 6.4–8.2)
RBC # BLD AUTO: 5.52 M/UL (ref 4.1–5.7)
SODIUM SERPL-SCNC: 138 MMOL/L (ref 136–145)
URATE SERPL-MCNC: 6.6 MG/DL (ref 3.5–7.2)
WBC # BLD AUTO: 6.2 K/UL (ref 4.1–11.1)

## 2022-02-23 PROCEDURE — 99214 OFFICE O/P EST MOD 30 MIN: CPT | Performed by: INTERNAL MEDICINE

## 2022-02-23 RX ORDER — AMLODIPINE BESYLATE 5 MG/1
TABLET ORAL
Qty: 90 TABLET | Refills: 1 | Status: SHIPPED | OUTPATIENT
Start: 2022-02-23 | End: 2022-08-04 | Stop reason: SDUPTHER

## 2022-02-23 RX ORDER — COLCHICINE 0.6 MG/1
CAPSULE ORAL
Qty: 30 CAPSULE | Refills: 0 | Status: SHIPPED | OUTPATIENT
Start: 2022-02-23 | End: 2022-05-14

## 2022-02-23 RX ORDER — AMOXICILLIN AND CLAVULANATE POTASSIUM 875; 125 MG/1; MG/1
1 TABLET, FILM COATED ORAL 2 TIMES DAILY
Qty: 14 TABLET | Refills: 0 | Status: SHIPPED | OUTPATIENT
Start: 2022-02-23 | End: 2022-03-02

## 2022-02-23 RX ORDER — HYDROCODONE BITARTRATE AND ACETAMINOPHEN 5; 325 MG/1; MG/1
1 TABLET ORAL
Status: CANCELLED | OUTPATIENT
Start: 2022-02-23

## 2022-02-23 RX ORDER — ALLOPURINOL 100 MG/1
200 TABLET ORAL DAILY
Qty: 180 TABLET | Refills: 3 | Status: SHIPPED | OUTPATIENT
Start: 2022-02-23 | End: 2022-07-18 | Stop reason: SDUPTHER
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
## 2022-02-23 NOTE — PATIENT INSTRUCTIONS
Common causes of high uric acid include:    - alcohol consumption.    - obesity. Blood pressure management is important to reduce your risk of future heart disease including heart failure, stroke, peripheral vascular disease, kidney disease (including and up to failure and need for dialysis). Locations for Physical therapy    - referral order has been sent to the [address] [place] [address] team electronically. - If you choose a different location, Please take a copy of physicians referral order with you.     [facility]    Physical Therapy At [address] [facility], [address], [address]   - Phone: [phone]    [address] [place] [address] Physical Therapy at [address] [facility] [address]   - Phone: [phone]    [address] [place] [address] Physical Therapy at [address], [address], [address] [place] [address]   - Phone [phone]    Other Locations    [facility]   - [facility], [address] [facility]   - phone: [phone] [facility]   - [address] [facility] [address]   - Phone: [phone]    [place] [facility]   - [address] [facility] [address]   - phone: [phone]    [facility]   - [address] [facility] [address], [address] [facility]   - phone: [phone]
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
## 2022-03-18 PROBLEM — M1A.9XX0 CHRONIC GOUT INVOLVING TOE OF RIGHT FOOT WITHOUT TOPHUS: Status: ACTIVE | Noted: 2017-05-17

## 2022-03-19 PROBLEM — E66.01 SEVERE OBESITY (HCC): Status: ACTIVE | Noted: 2019-05-06

## 2022-03-19 PROBLEM — R36.9 PENILE DISCHARGE: Status: ACTIVE | Noted: 2020-12-05

## 2022-04-17 NOTE — PROGRESS NOTES
- CBC shows normal blood counts. - metabolic profile - stable liver and kidney function   - uric acid remaining above goal of suppressing below 6 to help prevent gout flares - continue allopurinol. Follow-up to repeat level and consider increase in dosing.

## 2022-04-19 ENCOUNTER — HOSPITAL ENCOUNTER (OUTPATIENT)
Dept: PHYSICAL THERAPY | Age: 47
Discharge: HOME | End: 2022-04-19
Payer: MEDICAID

## 2022-04-19 PROCEDURE — 97110 THERAPEUTIC EXERCISES: CPT | Performed by: PHYSICAL THERAPIST

## 2022-04-19 PROCEDURE — 97140 MANUAL THERAPY 1/> REGIONS: CPT | Performed by: PHYSICAL THERAPIST

## 2022-04-19 PROCEDURE — 97161 PT EVAL LOW COMPLEX 20 MIN: CPT | Performed by: PHYSICAL THERAPIST
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
## 2022-04-19 NOTE — PROGRESS NOTES
Physical Therapy at [facility],   a part of [address] [facility]  [address] [facility], [address] [place] [address]  Phone: [phone]  Fax: [phone]    Plan of Care/Statement of Necessity for Physical Therapy Services  2-15    Patient name: Morena Mckeon  : 1975  Provider#: [id number]  Referral source: Mary Mai MD      Medical/Treatment Diagnosis: Strain of muscle, fascia and tendon of lower back, initial encounter [phone]     Prior Hospitalization: see medical history     Comorbidities: HTN. Prior Level of Function: Able to bend and stand with less pain. Medications: Verified on Patient Summary List  Start of Care: 2022      Onset Date: , exacerbation 3 months ago   The Plan of Care and following information is based on the information from the initial evaluation. Assessment/ key information: This patient presents with low back pain, severe tenderness, decreased ROM and flexibility, decreased hip strength, and impaired function.       Evaluation Complexity History MEDIUM  Complexity : 1-2 comorbidities / personal factors will impact the outcome/ POC ; Examination LOW Complexity : 1-2 Standardized tests and measures addressing body structure, function, activity limitation and / or participation in recreation  ;Presentation LOW Complexity : Stable, uncomplicated  ;Clinical Decision Making MEDIUM Complexity : FOTO score of 26-74  Overall Complexity Rating: LOW     Problem List: pain affecting function, decrease ROM, decrease strength, decrease ADL/ functional abilitiies, decrease activity tolerance and decrease flexibility/ joint mobility   Treatment Plan may include any combination of the following: Therapeutic exercise, Therapeutic activities, Neuromuscular re-education, Physical agent/modality, Manual therapy, Patient education, Functional mobility training and Home safety training  Patient / Family readiness to learn indicated by: asking questions, trying to perform skills and interest  Persons(s) to be included in education: patient (P)  Barriers to Learning/Limitations: None  Patient Goal (s): Stress relief.   Patient Self Reported Health Status: good  Rehabilitation Potential: good    Short Term Goals: To be accomplished in 4 weeks:  1. Tolerates flexibility and core strengthening exercises without increased pain. 2.  Able to stand for 15 minutes without increased pain. Long Term Goals: To be accomplished in 8 weeks:  1. Able to forward bend and touch fingertips to anterior ankle without pain. 2.  4+/5 hip strength to tolerate prolonged standing. 3.  Able to stand for 30 minutes without increased pain. Frequency / Duration: Patient to be seen 2 times per week for 8 weeks. Patient/ Caregiver education and instruction: exercises    [x]  Plan of care has been reviewed with BERNADINE Phelan, PT 4/19/2022    ________________________________________________________________________    I certify that the above Therapy Services are being furnished while the patient is under my care. I agree with the treatment plan and certify that this therapy is necessary.     Physician's Signature:____________________  Date:____________Time: _________         Anuj Santoyo MD

## 2022-04-29 ENCOUNTER — APPOINTMENT (OUTPATIENT)
Dept: PHYSICAL THERAPY | Age: 47
End: 2022-04-29
Payer: MEDICAID

## 2022-05-04 ENCOUNTER — APPOINTMENT (OUTPATIENT)
Dept: PHYSICAL THERAPY | Age: 47
End: 2022-05-04

## 2022-05-06 ENCOUNTER — APPOINTMENT (OUTPATIENT)
Dept: PHYSICAL THERAPY | Age: 47
End: 2022-05-06

## 2022-05-11 ENCOUNTER — APPOINTMENT (OUTPATIENT)
Dept: PHYSICAL THERAPY | Age: 47
End: 2022-05-11

## 2022-05-13 ENCOUNTER — APPOINTMENT (OUTPATIENT)
Dept: PHYSICAL THERAPY | Age: 47
End: 2022-05-13

## 2022-05-14 DIAGNOSIS — M1A.9XX0 CHRONIC GOUT INVOLVING TOE OF LEFT FOOT WITHOUT TOPHUS, UNSPECIFIED CAUSE: ICD-10-CM

## 2022-05-14 RX ORDER — COLCHICINE 0.6 MG/1
CAPSULE ORAL
Qty: 30 CAPSULE | Refills: 0 | Status: SHIPPED | OUTPATIENT
Start: 2022-05-14 | End: 2022-07-18

## 2022-05-18 ENCOUNTER — APPOINTMENT (OUTPATIENT)
Dept: PHYSICAL THERAPY | Age: 47
End: 2022-05-18

## 2022-05-20 ENCOUNTER — APPOINTMENT (OUTPATIENT)
Dept: PHYSICAL THERAPY | Age: 47
End: 2022-05-20

## 2022-05-25 ENCOUNTER — APPOINTMENT (OUTPATIENT)
Dept: PHYSICAL THERAPY | Age: 47
End: 2022-05-25

## 2022-05-27 ENCOUNTER — APPOINTMENT (OUTPATIENT)
Dept: PHYSICAL THERAPY | Age: 47
End: 2022-05-27

## 2022-07-16 DIAGNOSIS — M1A.9XX0 CHRONIC GOUT INVOLVING TOE OF LEFT FOOT WITHOUT TOPHUS, UNSPECIFIED CAUSE: ICD-10-CM

## 2022-07-18 RX ORDER — ALLOPURINOL 100 MG/1
200 TABLET ORAL DAILY
Qty: 180 TABLET | Refills: 0 | Status: SHIPPED | OUTPATIENT
Start: 2022-07-18

## 2022-07-18 RX ORDER — COLCHICINE 0.6 MG/1
CAPSULE ORAL
Qty: 30 CAPSULE | Refills: 0 | Status: SHIPPED | OUTPATIENT
Start: 2022-07-18 | End: 2022-08-04 | Stop reason: SDUPTHER

## 2022-07-18 NOTE — TELEPHONE ENCOUNTER
Pt is calling because he has had a gout flare bup and he is asking if provider can fill his rx for both of his gout medications , pharmacy gave him a weekend supply but pt is out , asking that this be sent into the pharmacy asap

## 2022-07-18 NOTE — TELEPHONE ENCOUNTER
Last visit 02/23/2022 MD Liz Ansari   Next appointment Nothing scheduled   Previous refill encounter(s)   02/23/2022 Zyloprim #180 with 3 refills,   05/14/2022 Mitigare #30     For Pharmacy Admin Tracking Only     Intervention Detail: New Rx: 2, reason: Patient Preference   Time Spent (min): 5        Requested Prescriptions     Pending Prescriptions Disp Refills    colchicine (MITIGARE) 0.6 mg capsule [Pharmacy Med Name: COLCHICINE 0.6 MG CAPSULE] 30 Capsule 0     Sig: FOR GOUT FLARE: ON DAY 1: TAKE 2, FOLLOWED BY 1CAP 1 HOUR LATER. ON DAY 2 & BEYOND 1 DAILY UNTIL FLARE IS RESOLVED.  allopurinoL (ZYLOPRIM) 100 mg tablet 180 Tablet 0     Sig: Take 2 Tablets by mouth daily.

## 2022-08-01 ENCOUNTER — TELEPHONE (OUTPATIENT)
Dept: INTERNAL MEDICINE CLINIC | Age: 47
End: 2022-08-01

## 2022-08-01 DIAGNOSIS — M1A.9XX0 CHRONIC GOUT INVOLVING TOE OF LEFT FOOT WITHOUT TOPHUS, UNSPECIFIED CAUSE: ICD-10-CM

## 2022-08-01 DIAGNOSIS — I10 ESSENTIAL HYPERTENSION: ICD-10-CM

## 2022-08-01 NOTE — TELEPHONE ENCOUNTER
----- Message from Abdon Laser sent at 7/18/2022  4:28 PM EDT -----  Subject: Message to Provider    QUESTIONS  Information for Provider? Patient calling to check on status of   medication, says he is in a lot of pain and needs this to be sent in asap.     ---------------------------------------------------------------------------  --------------  [address]  [phone]; OK to leave message on voicemail  ---------------------------------------------------------------------------  --------------  SCRIPT ANSWERS  Relationship to Patient?  Self

## 2022-08-03 NOTE — TELEPHONE ENCOUNTER
Verified with pt that he did receive the medication last month. But he states he needs another refill of the gout med and his BP med. Pt added to schedule for follow up     Colchicine last sent 7/18/22  Amlodipine filled 2/23/22    LOV 3/23/2022  Future Appointments   Date Time Provider Jayna Fields   8/4/2022  8:00 AM Sowmya Bliss, PT [facility]   8/15/2022 10:00 AM Nathalia Strange NP [facility] AMB     Pt added for follow up visit with JUDIT Jerez.

## 2022-08-04 ENCOUNTER — HOSPITAL ENCOUNTER (OUTPATIENT)
Dept: PHYSICAL THERAPY | Age: 47
Discharge: HOME | End: 2022-08-04
Payer: MEDICAID

## 2022-08-04 PROCEDURE — 97161 PT EVAL LOW COMPLEX 20 MIN: CPT | Performed by: PHYSICAL THERAPIST

## 2022-08-04 RX ORDER — COLCHICINE 0.6 MG/1
CAPSULE ORAL
Qty: 30 CAPSULE | Refills: 0 | Status: SHIPPED | OUTPATIENT
Start: 2022-08-04

## 2022-08-04 RX ORDER — AMLODIPINE BESYLATE 5 MG/1
TABLET ORAL
Qty: 90 TABLET | Refills: 0 | Status: SHIPPED | OUTPATIENT
Start: 2022-08-04

## 2022-08-04 NOTE — PROGRESS NOTES
Physical Therapy at [facility],   a part of [address]  [address] [facility]  [place], [address]  Phone: [phone]  Fax: [phone]    Plan of Care/Statement of Necessity for Physical Therapy Services  2-15    Patient name: La Campa  : 1975  Provider#: [id number]  Referral source: Ivania Crum MD      Medical/Treatment Diagnosis: Strain of muscle, fascia and tendon of lower back, initial encounter [S39.012A]     Prior Hospitalization: see medical history     Comorbidities: HTN  Prior Level of Function: Able to reach overhead without back pain. Medications: Verified on Patient Summary List    Start of Care: 2022      Onset Date: 2021, exacerbation 2022       The Plan of Care and following information is based on the information from the initial evaluation. Assessment/ key information: This patient feels 100% at this time. He has no pain today, has returned to full duty work and is able to work out at [employer] without complaint. Evaluation Complexity History MEDIUM  Complexity : 1-2 comorbidities / personal factors will impact the outcome/ POC ; Examination LOW Complexity : 1-2 Standardized tests and measures addressing body structure, function, activity limitation and / or participation in recreation  ;Presentation LOW Complexity : Stable, uncomplicated  ;Clinical Decision Making LOW Complexity : FOTO score of   Overall Complexity Rating: LOW     Problem List: decrease flexibility/ joint mobility   Treatment Plan may include any combination of the following: Other: HEP  Patient / Family readiness to learn indicated by: asking questions, trying to perform skills, and interest  Persons(s) to be included in education: patient (P)  Barriers to Learning/Limitations: None  Patient Goal (s): Close out my case.   Patient Self Reported Health Status: good  Rehabilitation Potential: good    No new goals at this time.       We reviewed his HEP and I instructed in proper technique to perform a piriformis stretch, which should help his buttock tightness and lack of hip IR PROM. I am addressing his long term goals I set in April 2022. Long Term Goals:   1. Able to forward bend and touch fingertips to anterior ankle without pain. Improved. Has no pain but limited by hamstring tightness, not back pain. 2.  4+/5 hip strength to tolerate prolonged standing. Met  3. Able to stand for 30 minutes without increased pain. Met    Frequency / Duration: No formal therapy is needed at this time. Patient will continue with his HEP and workouts in the gym. Patient to continue with his HEP and workouts in the gym. Patient/ Caregiver education and instruction: exercises    [x]  Plan of care has been reviewed with BERNADINE Munson, PT 8/4/2022   ________________________________________________________________________    I certify that the above Therapy Services are being furnished while the patient is under my care. I agree with the treatment plan and certify that this therapy is necessary.     Physician's Signature:____________________  Date:____________Time: _________      Demetrius Zamorano MD

## 2022-08-04 NOTE — PROGRESS NOTES
PT INITIAL EVALUATION NOTE - [facility] 2-15    Patient Name: Anny Pandey  Date:2022  : 1975  [x]  Patient  Verified  Payor: [address] / Plan: [address] [facility] / Product Type: Managed Care Medicaid /    In time: 8:10 am  Out time: 8:30 am  Total Treatment Time (min): 20  Visit #: 1     Treatment Area: Strain of muscle, fascia and tendon of lower back, initial encounter [S39.012A]    SUBJECTIVE  Pain Level (0-10 scale): 0  Any medication changes, allergies to medications, adverse drug reactions, diagnosis change, or new procedure performed?: [] No    [x] Yes (see summary sheet for update)  Subjective:    Patient late for appointment today. He has no paperwork filled out. He was evaluated here 22. Back pain at that time was 8/10, but he never returned for treatment after his initial visit. States he gradually felt better. No pain presently, states he feels 100% at this time. He is back to work full time, full days without complaint. He states he was instructed to return to PT to [facility] out his case. \"  He does wake up with discomfort in his buttock, but does his stretches which help. He is going to the gym, rides the bike, walks, lifts some weights. He can feel it in his back when he lifts overhead, but this doesn't last.    OBJECTIVE/EXAMINATION    AROM: (LUMBAR)  FB:  Fingertips to distal shin      STRENGTH:   R   L  HIP FLEXORS:     5   5  HIP ABDUCTORS:  5-   5      FLEXIBILITY:   R   L  HAMSTRINGS:  Mod   Mod  HIP INT ROTATORS:  Severe   Severe  HIP EXT ROTATORS  Mod   Mod    SPECIAL TESTS:  SLUMP SIGN:  Negative  SLR:  Negative    PALPATION:  No tenderness in lower back or buttock.            With   [x] TE   [] TA   [] Neuro   [] SC   [] other: Patient Education: [x] Review HEP    [] Progressed/Changed HEP based on:   [] positioning   [] body mechanics   [] transfers   [] heat/ice application    [] other:          Pain Level (0-10 scale) post treatment: 0    ASSESSMENT/Changes in Function:     [x]  See Plan of Dakota Garner V, PT 8/4/2022

## 2022-08-26 ENCOUNTER — TELEPHONE (OUTPATIENT)
Dept: INTERNAL MEDICINE CLINIC | Age: 47
End: 2022-08-26

## 2022-08-26 DIAGNOSIS — M1A.9XX0 CHRONIC GOUT INVOLVING TOE OF LEFT FOOT WITHOUT TOPHUS, UNSPECIFIED CAUSE: ICD-10-CM

## 2022-08-26 RX ORDER — COLCHICINE 0.6 MG/1
CAPSULE ORAL
Qty: 30 CAPSULE | Refills: 0 | Status: CANCELLED | OUTPATIENT
Start: 2022-08-26

## 2022-08-26 NOTE — TELEPHONE ENCOUNTER
Please contact the patient's plan to initiate a prior authorization for Mitigare 0.6 mg. Per [facility] via fax the previous Prior Authorization . Thank you. Requested Prescriptions     Pending Prescriptions Disp Refills    colchicine (MITIGARE) 0.6 mg capsule 30 Capsule 0     Sig: FOR GOUT FLARE: ON DAY 1: TAKE 2, FOLLOWED BY 1CAP 1 HOUR LATER. ON DAY 2 & BEYOND 1 DAILY UNTIL FLARE IS RESOLVED.

## 2023-05-06 DIAGNOSIS — I10 ESSENTIAL (PRIMARY) HYPERTENSION: ICD-10-CM

## 2023-05-08 NOTE — TELEPHONE ENCOUNTER
Pt now has a new provider out of network     Requested Prescriptions     Pending Prescriptions Disp Refills    amLODIPine (NORVASC) 5 MG tablet [Pharmacy Med Name: AMLODIPINE BESYLATE 5 MG TAB] 90 tablet      Sig: TAKE 1 TABLET BY MOUTH EVERY DAY

## 2023-05-09 RX ORDER — AMLODIPINE BESYLATE 5 MG/1
TABLET ORAL
Qty: 90 TABLET | Refills: 3 | Status: SHIPPED | OUTPATIENT
Start: 2023-05-09

## 2023-05-26 RX ORDER — SILDENAFIL 100 MG/1
TABLET, FILM COATED ORAL
COMMUNITY
Start: 2021-04-11

## 2023-05-26 RX ORDER — COLCHICINE 0.6 MG/1
CAPSULE ORAL
COMMUNITY
Start: 2022-08-04

## 2023-05-26 RX ORDER — ALLOPURINOL 100 MG/1
200 TABLET ORAL DAILY
COMMUNITY
Start: 2022-07-18

## 2023-08-01 DIAGNOSIS — M1A.9XX0 CHRONIC GOUT, UNSPECIFIED, WITHOUT TOPHUS (TOPHI): ICD-10-CM

## 2023-08-02 RX ORDER — COLCHICINE 0.6 MG/1
CAPSULE ORAL
Qty: 27 CAPSULE | Refills: 1 | OUTPATIENT
Start: 2023-08-02

## 2023-08-30 RX ORDER — COLCHICINE 0.6 MG/1
CAPSULE ORAL
Qty: 30 CAPSULE | OUTPATIENT
Start: 2023-08-30

## 2023-08-30 RX ORDER — ALLOPURINOL 100 MG/1
200 TABLET ORAL DAILY
Qty: 30 TABLET | OUTPATIENT
Start: 2023-08-30

## 2023-08-30 NOTE — TELEPHONE ENCOUNTER
Chief Complaint   Patient presents with    Medication Refill     Requested Prescriptions     Pending Prescriptions Disp Refills    colchicine (MITIGARE) 0.6 MG capsule 30 capsule      Sig: FOR GOUT FLARE: ON DAY 1: TAKE 2, FOLLOWED BY 1CAP 1 HOUR LATER. ON DAY 2 & BEYOND 1 DAILY UNTIL FLARE IS RESOLVED.    allopurinol (ZYLOPRIM) 100 MG tablet 30 tablet      Sig: Take 2 tablets by mouth daily       Allergies:   Allergies   Allergen Reactions    Ceftriaxone Shortness Of Breath           Last visit with clinic:  2/23/2022   Next visit with clinic: 11/16/2023     Last visit with this provider: 12/3/2020   Next Visit with this provider: Visit date not found        Signed by Janine CORRIGAN  08/30/23  11:10 AM

## 2023-09-01 RX ORDER — ALLOPURINOL 100 MG/1
200 TABLET ORAL DAILY
Qty: 30 TABLET | Refills: 0 | OUTPATIENT
Start: 2023-09-01

## 2023-09-01 RX ORDER — COLCHICINE 0.6 MG/1
CAPSULE ORAL
Qty: 30 CAPSULE | Refills: 0 | OUTPATIENT
Start: 2023-09-01

## 2023-09-01 NOTE — TELEPHONE ENCOUNTER
Pt left a voice message requesting enough medication until can be seen by new provider on 11/16/2023. Pt stated is having a very bad gout attack. [phone] [phone]    Request were denied on 08/28/2023. Last appointment: 02/23/2022 MD Daniel Hernandez   Next appointment: 11/16/2023 MD Tootie Bui   Previous refill encounter(s):   08/04/2022 Mitigare #30,   07/18/2022 Zyloprim #180. For Pharmacy Admin Tracking Only    Program: Medication Refill  Intervention Detail: New Rx: 2, reason: Patient Preference  Time Spent (min): 5      Requested Prescriptions     Pending Prescriptions Disp Refills    colchicine (MITIGARE) 0.6 MG capsule 30 capsule 0     Sig: FOR GOUT FLARE: ON DAY 1: TAKE 2, FOLLOWED BY 1CAP 1 HOUR LATER.  ON DAY 2 & BEYOND 1 DAILY UNTIL FLARE IS RESOLVED.    allopurinol (ZYLOPRIM) 100 MG tablet 30 tablet 0     Sig: Take 2 tablets by mouth daily

## 2023-09-01 NOTE — TELEPHONE ENCOUNTER
Unable to refill medication as since pts last office visit was 2/2022 and his last known refill was July 2022 for a 3 mo supply. Concern for non compliance. And if pt is having a gout flare would recommend evaluation at urgent care for labs and in office evaluation.

## 2024-10-23 ENCOUNTER — OFFICE VISIT (OUTPATIENT)
Age: 49
End: 2024-10-23
Payer: MEDICAID

## 2024-10-23 VITALS
SYSTOLIC BLOOD PRESSURE: 115 MMHG | BODY MASS INDEX: 35.62 KG/M2 | TEMPERATURE: 98.3 F | OXYGEN SATURATION: 97 % | WEIGHT: 263 LBS | RESPIRATION RATE: 16 BRPM | HEART RATE: 62 BPM | HEIGHT: 72 IN | DIASTOLIC BLOOD PRESSURE: 68 MMHG

## 2024-10-23 DIAGNOSIS — E66.01 CLASS 2 SEVERE OBESITY DUE TO EXCESS CALORIES WITH SERIOUS COMORBIDITY AND BODY MASS INDEX (BMI) OF 35.0 TO 35.9 IN ADULT: ICD-10-CM

## 2024-10-23 DIAGNOSIS — Z76.89 ENCOUNTER TO ESTABLISH CARE: ICD-10-CM

## 2024-10-23 DIAGNOSIS — M1A.9XX0 CHRONIC GOUT INVOLVING TOE OF RIGHT FOOT WITHOUT TOPHUS, UNSPECIFIED CAUSE: Primary | ICD-10-CM

## 2024-10-23 DIAGNOSIS — I10 ESSENTIAL (PRIMARY) HYPERTENSION: ICD-10-CM

## 2024-10-23 DIAGNOSIS — N52.9 ERECTILE DYSFUNCTION, UNSPECIFIED ERECTILE DYSFUNCTION TYPE: ICD-10-CM

## 2024-10-23 DIAGNOSIS — E66.812 CLASS 2 SEVERE OBESITY DUE TO EXCESS CALORIES WITH SERIOUS COMORBIDITY AND BODY MASS INDEX (BMI) OF 35.0 TO 35.9 IN ADULT: ICD-10-CM

## 2024-10-23 PROCEDURE — 3078F DIAST BP <80 MM HG: CPT | Performed by: STUDENT IN AN ORGANIZED HEALTH CARE EDUCATION/TRAINING PROGRAM

## 2024-10-23 PROCEDURE — 3074F SYST BP LT 130 MM HG: CPT | Performed by: STUDENT IN AN ORGANIZED HEALTH CARE EDUCATION/TRAINING PROGRAM

## 2024-10-23 PROCEDURE — 99214 OFFICE O/P EST MOD 30 MIN: CPT | Performed by: STUDENT IN AN ORGANIZED HEALTH CARE EDUCATION/TRAINING PROGRAM

## 2024-10-23 RX ORDER — AMLODIPINE BESYLATE 5 MG/1
5 TABLET ORAL DAILY
Qty: 90 TABLET | Refills: 3 | Status: SHIPPED | OUTPATIENT
Start: 2024-10-23

## 2024-10-23 RX ORDER — ALLOPURINOL 100 MG/1
100 TABLET ORAL DAILY
Qty: 90 TABLET | Refills: 1 | Status: SHIPPED | OUTPATIENT
Start: 2024-10-23

## 2024-10-23 RX ORDER — COLCHICINE 0.6 MG/1
CAPSULE ORAL
Qty: 45 CAPSULE | Refills: 1 | Status: SHIPPED | OUTPATIENT
Start: 2024-10-23

## 2024-10-23 RX ORDER — SILDENAFIL 100 MG/1
100 TABLET, FILM COATED ORAL PRN
Qty: 45 TABLET | Refills: 1 | Status: SHIPPED | OUTPATIENT
Start: 2024-10-23

## 2024-10-23 SDOH — ECONOMIC STABILITY: FOOD INSECURITY: WITHIN THE PAST 12 MONTHS, YOU WORRIED THAT YOUR FOOD WOULD RUN OUT BEFORE YOU GOT MONEY TO BUY MORE.: NEVER TRUE

## 2024-10-23 SDOH — ECONOMIC STABILITY: FOOD INSECURITY: WITHIN THE PAST 12 MONTHS, THE FOOD YOU BOUGHT JUST DIDN'T LAST AND YOU DIDN'T HAVE MONEY TO GET MORE.: NEVER TRUE

## 2024-10-23 SDOH — ECONOMIC STABILITY: INCOME INSECURITY: HOW HARD IS IT FOR YOU TO PAY FOR THE VERY BASICS LIKE FOOD, HOUSING, MEDICAL CARE, AND HEATING?: NOT HARD AT ALL

## 2024-10-23 ASSESSMENT — PATIENT HEALTH QUESTIONNAIRE - PHQ9
SUM OF ALL RESPONSES TO PHQ QUESTIONS 1-9: 0
SUM OF ALL RESPONSES TO PHQ QUESTIONS 1-9: 0
2. FEELING DOWN, DEPRESSED OR HOPELESS: NOT AT ALL
1. LITTLE INTEREST OR PLEASURE IN DOING THINGS: NOT AT ALL
SUM OF ALL RESPONSES TO PHQ QUESTIONS 1-9: 0
SUM OF ALL RESPONSES TO PHQ9 QUESTIONS 1 & 2: 0
SUM OF ALL RESPONSES TO PHQ QUESTIONS 1-9: 0

## 2024-10-23 ASSESSMENT — ENCOUNTER SYMPTOMS
SHORTNESS OF BREATH: 0
COUGH: 0
SORE THROAT: 0

## 2024-10-23 NOTE — PROGRESS NOTES
RM:7    Chief Complaint   Patient presents with    Establish Care     Pt stated he is having gout issues       Fasting Yes    Vitals:    10/23/24 0918   BP: 115/68   Site: Left Upper Arm   Position: Sitting   Cuff Size: Large Adult   Pulse: 62   Resp: 16   Temp: 98.3 °F (36.8 °C)   TempSrc: Oral   SpO2: 97%   Weight: 119.3 kg (263 lb)   Height: 1.829 m (6')             No data to display                \"Have you been to the ER, urgent care clinic since your last visit?  Hospitalized since your last visit?\"    NO    “Have you seen or consulted any other health care providers outside of [facility] since your last visit?”    NO        “Have you had a colorectal cancer screening such as a colonoscopy/FIT/Cologuard?    NO    Click Here for Release of Records Request   AVS  education, follow up, and recommendations provided and addressed with patient.  services used to advise patient NO

## 2024-10-23 NOTE — PATIENT INSTRUCTIONS
appointments and sometimes lab monitoring; required office visits may be as frequent as every 4 weeks (depending on the medication), especially in the beginning.   When medication shortages occur, it is the responsibility of the patient (NOT the provider or office staff) to contact local pharmacies to find available medication; our office will do its best to respond to calls/messages in order to send prescriptions to the desired pharmacy in a timely manner. In the event we need to switch medications due to a prolonged shortage, an appointment may be required.   Possible side effects listed above include the most commonly observed side effects; please discuss individual concerns with a provider or reference medication pamphlet/website for a more detailed list of potential side effects.

## 2024-10-23 NOTE — PROGRESS NOTES
[facility] Pediatrics and Internal Medicine    Assessment and Plan   Guerrero Hardwick is a 49 y.o. male who presents for Establish Care (Pt stated he is having gout issues)     Diagnosis Orders   1. Chronic gout involving toe of right foot without tophus, unspecified cause  allopurinol (ZYLOPRIM) 100 MG tablet    colchicine (MITIGARE) 0.6 MG capsule      2. Class 2 severe obesity due to excess calories with serious comorbidity and body mass index (BMI) of 35.0 to 35.9 in adult  CBC    Comprehensive Metabolic Panel    Hemoglobin A1C    Lipid Panel      3. Essential (primary) hypertension  amLODIPine (NORVASC) 5 MG tablet      4. Erectile dysfunction, unspecified erectile dysfunction type  sildenafil (VIAGRA) 100 MG tablet    Testosterone, free, total    Testosterone, free, total      5. Encounter to establish care           1: Chronic, exacerbation.  Restart daily allopurinol, colchicine as needed.  2: Patient presents to establish care visit with me.  Acute concerns addressed.  Chronic problems reviewed.  Medications and history reviewed.  Health maintenance reviewed and updated.  See above for additional information.  3: Chronic, well-controlled.  Continue amlodipine 5 mg daily.  4: Chronic, well-controlled with Viagra 100 mg as needed.  Continue current treatment and test testosterone.  5: Patient presents to establish care visit with me.  Acute concerns addressed.  Chronic problems reviewed.  Medications and history reviewed.  Health maintenance reviewed and updated.  See above for additional information.    Click Here for Release of Records Request     Return in about 3 months (around 1/23/2025) for Weight loss f/up.     Subjective   Guerrero Hardwick is a 49 y.o. male who presents for Establish Care (Pt stated he is having gout issues)    Establish Care  Medical problems include:  HTN  Gout  Seeing sleep for JOSÉ LUIS eval  Diet: balanced, some veggies  Exercise: gym 4x/week  Tobacco:  reports that he has never

## 2025-01-23 ENCOUNTER — OFFICE VISIT (OUTPATIENT)
Age: 50
End: 2025-01-23
Payer: MEDICAID

## 2025-01-23 VITALS
BODY MASS INDEX: 39.08 KG/M2 | WEIGHT: 273 LBS | HEIGHT: 70 IN | OXYGEN SATURATION: 98 % | DIASTOLIC BLOOD PRESSURE: 80 MMHG | SYSTOLIC BLOOD PRESSURE: 134 MMHG | TEMPERATURE: 98.5 F | HEART RATE: 69 BPM | RESPIRATION RATE: 16 BRPM

## 2025-01-23 DIAGNOSIS — E66.01 CLASS 2 SEVERE OBESITY DUE TO EXCESS CALORIES WITH SERIOUS COMORBIDITY AND BODY MASS INDEX (BMI) OF 39.0 TO 39.9 IN ADULT: Primary | ICD-10-CM

## 2025-01-23 DIAGNOSIS — E66.812 CLASS 2 SEVERE OBESITY DUE TO EXCESS CALORIES WITH SERIOUS COMORBIDITY AND BODY MASS INDEX (BMI) OF 39.0 TO 39.9 IN ADULT: Primary | ICD-10-CM

## 2025-01-23 DIAGNOSIS — I10 ESSENTIAL (PRIMARY) HYPERTENSION: ICD-10-CM

## 2025-01-23 DIAGNOSIS — N52.9 ERECTILE DYSFUNCTION, UNSPECIFIED ERECTILE DYSFUNCTION TYPE: ICD-10-CM

## 2025-01-23 DIAGNOSIS — M1A.9XX0 CHRONIC GOUT INVOLVING TOE OF RIGHT FOOT WITHOUT TOPHUS, UNSPECIFIED CAUSE: ICD-10-CM

## 2025-01-23 PROCEDURE — 99214 OFFICE O/P EST MOD 30 MIN: CPT | Performed by: STUDENT IN AN ORGANIZED HEALTH CARE EDUCATION/TRAINING PROGRAM

## 2025-01-23 RX ORDER — AMLODIPINE BESYLATE 5 MG/1
5 TABLET ORAL DAILY
Qty: 90 TABLET | Refills: 3 | Status: SHIPPED | OUTPATIENT
Start: 2025-01-23

## 2025-01-23 RX ORDER — COLCHICINE 0.6 MG/1
CAPSULE ORAL
Qty: 45 CAPSULE | Refills: 1 | Status: SHIPPED | OUTPATIENT
Start: 2025-01-23

## 2025-01-23 RX ORDER — ALLOPURINOL 100 MG/1
100 TABLET ORAL DAILY
Qty: 90 TABLET | Refills: 3 | Status: SHIPPED | OUTPATIENT
Start: 2025-01-23

## 2025-01-23 RX ORDER — ALLOPURINOL 100 MG/1
100 TABLET ORAL DAILY
Qty: 90 TABLET | Refills: 1 | Status: SHIPPED | OUTPATIENT
Start: 2025-01-23 | End: 2025-01-23

## 2025-01-23 RX ORDER — BLOOD PRESSURE TEST KIT
KIT MISCELLANEOUS
Qty: 1 KIT | Refills: 0 | Status: SHIPPED | OUTPATIENT
Start: 2025-01-23

## 2025-01-23 RX ORDER — SILDENAFIL 100 MG/1
100 TABLET, FILM COATED ORAL PRN
Qty: 45 TABLET | Refills: 1 | Status: SHIPPED | OUTPATIENT
Start: 2025-01-23

## 2025-01-23 SDOH — ECONOMIC STABILITY: FOOD INSECURITY: WITHIN THE PAST 12 MONTHS, YOU WORRIED THAT YOUR FOOD WOULD RUN OUT BEFORE YOU GOT MONEY TO BUY MORE.: NEVER TRUE

## 2025-01-23 SDOH — ECONOMIC STABILITY: FOOD INSECURITY: WITHIN THE PAST 12 MONTHS, THE FOOD YOU BOUGHT JUST DIDN'T LAST AND YOU DIDN'T HAVE MONEY TO GET MORE.: NEVER TRUE

## 2025-01-23 ASSESSMENT — PATIENT HEALTH QUESTIONNAIRE - PHQ9
SUM OF ALL RESPONSES TO PHQ QUESTIONS 1-9: 0
SUM OF ALL RESPONSES TO PHQ9 QUESTIONS 1 & 2: 0
SUM OF ALL RESPONSES TO PHQ QUESTIONS 1-9: 0
2. FEELING DOWN, DEPRESSED OR HOPELESS: NOT AT ALL
1. LITTLE INTEREST OR PLEASURE IN DOING THINGS: NOT AT ALL

## 2025-01-23 ASSESSMENT — ENCOUNTER SYMPTOMS
SORE THROAT: 0
SHORTNESS OF BREATH: 0
COUGH: 0

## 2025-01-23 NOTE — PROGRESS NOTES
RM:9    Chief Complaint   Patient presents with    Follow-up     Pt is here for a follow for bloodwork       Fasting No    Vitals:    01/23/25 0807   BP: 134/80   Site: Right Lower Arm   Position: Sitting   Cuff Size: Large Adult   Pulse: 69   Resp: 16   Temp: 98.5 °F (36.9 °C)   TempSrc: Oral   SpO2: 98%   Weight: 123.8 kg (273 lb)   Height: 1.778 m (5' 10\")             No data to display                \"Have you been to the ER, urgent care clinic since your last visit?  Hospitalized since your last visit?\"    NO    “Have you seen or consulted any other health care providers outside of [facility] since your last visit?”    NO        “Have you had a colorectal cancer screening such as a colonoscopy/FIT/Cologuard?    NO    No colonoscopy on file  No cologuard on file  No FIT/FOBT on file   No flexible sigmoidoscopy on file         Click Here for Release of Records Request   AVS  education, follow up, and recommendations provided and addressed with patient.  services used to advise patient no

## 2025-01-23 NOTE — PROGRESS NOTES
[facility] Internal Medicine    Guerrero Hardwick (:  1975) is a 49 y.o. male, Established patient, here for evaluation of the following chief complaint(s):  Follow-up (Pt is here for a follow for bloodwork)    Assessment & Plan  1. Class 2 severe obesity due to excess calories with serious comorbidity and body mass index (BMI) of 39.0 to 39.9 in adult  Chronic, uncontrolled.  Counseled on healthy diet and exercise habits.  He is interested in weight loss treatment.  Reviewed options.  I believe the Contrave would be the best medication option for him.  Phentermine, other stimulants are not a good option due to his blood pressure.  Will refer to nutrition.  - CBC; Future  - Comprehensive Metabolic Panel; Future  - Hemoglobin A1C; Future  - Lipid Panel; Future  - [facility] Christianson [facility]  - naltrexone-buPROPion (CONTRAVE) 8-90 MG per extended release tablet; Take 2 tablets by mouth 2 times daily  Dispense: 60 tablet; Refill: 1    2. Essential (primary) hypertension  Chronic, well-controlled.  Continue current regimen.  - amLODIPine (NORVASC) 5 MG tablet; Take 1 tablet by mouth daily  Dispense: 90 tablet; Refill: 3  - Blood Pressure KIT; Use daily to check blood pressure.  Dispense: 1 kit; Refill: 0    3. 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  Continue current regimen.  - colchicine (MITIGARE) 0.6 MG capsule; Take 2 capsules on first day of flare up, then take 1 capsule daily until 2 days after flare stops  Dispense: 45 capsule; Refill: 1  - allopurinol (ZYLOPRIM) 100 MG tablet; Take 1 tablet by mouth daily  Dispense: 90 tablet; Refill: 3    4. Erectile dysfunction, unspecified erectile dysfunction type  Chronic, well-controlled.  Continue current regimen.  - sildenafil (VIAGRA) 100 MG tablet; Take 1 tablet by mouth as needed for Erectile Dysfunction  Dispense: 45 tablet; Refill: 1  -

## 2025-01-24 ENCOUNTER — LAB (OUTPATIENT)
Age: 50
End: 2025-01-24

## 2025-01-24 DIAGNOSIS — E66.812 CLASS 2 SEVERE OBESITY DUE TO EXCESS CALORIES WITH SERIOUS COMORBIDITY AND BODY MASS INDEX (BMI) OF 39.0 TO 39.9 IN ADULT: ICD-10-CM

## 2025-01-24 DIAGNOSIS — E66.01 CLASS 2 SEVERE OBESITY DUE TO EXCESS CALORIES WITH SERIOUS COMORBIDITY AND BODY MASS INDEX (BMI) OF 39.0 TO 39.9 IN ADULT: ICD-10-CM

## 2025-01-24 DIAGNOSIS — N52.9 ERECTILE DYSFUNCTION, UNSPECIFIED ERECTILE DYSFUNCTION TYPE: ICD-10-CM

## 2025-01-24 LAB
ALBUMIN SERPL-MCNC: 3.7 G/DL (ref 3.5–5)
ALBUMIN/GLOB SERPL: 1.1 (ref 1.1–2.2)
ALP SERPL-CCNC: 69 U/L (ref 45–117)
ALT SERPL-CCNC: 30 U/L (ref 12–78)
ANION GAP SERPL CALC-SCNC: 3 MMOL/L (ref 2–12)
AST SERPL-CCNC: 15 U/L (ref 15–37)
BILIRUB SERPL-MCNC: 0.6 MG/DL (ref 0.2–1)
BUN SERPL-MCNC: 12 MG/DL (ref 6–20)
BUN/CREAT SERPL: 11 (ref 12–20)
CALCIUM SERPL-MCNC: 8.8 MG/DL (ref 8.5–10.1)
CHLORIDE SERPL-SCNC: 104 MMOL/L (ref 97–108)
CHOLEST SERPL-MCNC: 242 MG/DL
CO2 SERPL-SCNC: 29 MMOL/L (ref 21–32)
CREAT SERPL-MCNC: 1.13 MG/DL (ref 0.7–1.3)
ERYTHROCYTE [DISTWIDTH] IN BLOOD BY AUTOMATED COUNT: 12.7 % (ref 11.5–14.5)
EST. AVERAGE GLUCOSE BLD GHB EST-MCNC: 108 MG/DL
GLOBULIN SER CALC-MCNC: 3.3 G/DL (ref 2–4)
GLUCOSE SERPL-MCNC: 106 MG/DL (ref 65–100)
HBA1C MFR BLD: 5.4 % (ref 4–5.6)
HCT VFR BLD AUTO: 47.2 % (ref 36.6–50.3)
HDLC SERPL-MCNC: 45 MG/DL
HDLC SERPL: 5.4 (ref 0–5)
HGB BLD-MCNC: 16.2 G/DL (ref 12.1–17)
LDLC SERPL CALC-MCNC: 158.8 MG/DL (ref 0–100)
MCH RBC QN AUTO: 30.9 PG (ref 26–34)
MCHC RBC AUTO-ENTMCNC: 34.3 G/DL (ref 30–36.5)
MCV RBC AUTO: 90.1 FL (ref 80–99)
NRBC # BLD: 0 K/UL (ref 0–0.01)
NRBC BLD-RTO: 0 PER 100 WBC
PLATELET # BLD AUTO: 258 K/UL (ref 150–400)
PMV BLD AUTO: 10.5 FL (ref 8.9–12.9)
POTASSIUM SERPL-SCNC: 4.1 MMOL/L (ref 3.5–5.1)
PROT SERPL-MCNC: 7 G/DL (ref 6.4–8.2)
RBC # BLD AUTO: 5.24 M/UL (ref 4.1–5.7)
SODIUM SERPL-SCNC: 136 MMOL/L (ref 136–145)
TRIGL SERPL-MCNC: 191 MG/DL
VLDLC SERPL CALC-MCNC: 38.2 MG/DL
WBC # BLD AUTO: 6.3 K/UL (ref 4.1–11.1)

## 2025-01-26 LAB — TESTOST SERPL-MCNC: 361 NG/DL (ref 264–916)

## 2025-01-27 DIAGNOSIS — E78.00 PURE HYPERCHOLESTEROLEMIA: Primary | ICD-10-CM

## 2025-01-27 LAB
TESTOST FREE SERPL-MCNC: 9.2 PG/ML (ref 7.2–24)
TESTOST SERPL-MCNC: 361 NG/DL (ref 264–916)

## 2025-01-27 RX ORDER — ROSUVASTATIN CALCIUM 20 MG/1
20 TABLET, COATED ORAL NIGHTLY
Qty: 90 TABLET | Refills: 1 | Status: SHIPPED | OUTPATIENT
Start: 2025-01-27

## 2025-06-02 DIAGNOSIS — N52.9 ERECTILE DYSFUNCTION, UNSPECIFIED ERECTILE DYSFUNCTION TYPE: ICD-10-CM

## 2025-06-02 DIAGNOSIS — M1A.9XX0 CHRONIC GOUT INVOLVING TOE OF RIGHT FOOT WITHOUT TOPHUS, UNSPECIFIED CAUSE: ICD-10-CM

## 2025-06-03 RX ORDER — SILDENAFIL 100 MG/1
100 TABLET, FILM COATED ORAL PRN
Qty: 45 TABLET | Refills: 1 | Status: SHIPPED | OUTPATIENT
Start: 2025-06-03

## 2025-06-03 RX ORDER — COLCHICINE 0.6 MG/1
TABLET ORAL
Qty: 35 TABLET | Refills: 2 | Status: SHIPPED | OUTPATIENT
Start: 2025-06-03

## 2025-06-03 NOTE — TELEPHONE ENCOUNTER
Last appointment: 01/23/2025 MD Prince   Next appointment: Nothing scheduled   Previous refill encounter(s):   01/23/2025:  - Colchicine #45 with 1 refill,   - Viagra #45 with 1 refill.     For Pharmacy Admin Tracking Only    Program: Medication Refill  Intervention Detail: New Rx: 2, reason: Patient Preference  Time Spent (min): 5    Requested Prescriptions     Pending Prescriptions Disp Refills    colchicine (COLCRYS) 0.6 MG tablet [Pharmacy Med Name: COLCHICINE 0.6 MG TABLET] 45 tablet 1     Sig: TAKE 2 TABLETS ON FIRST DAY OF FLARE UP, THEN TAKE 1 TABLET DAILY UNTIL 2 DAYS AFTER FLARE STOPS    sildenafil (VIAGRA) 100 MG tablet [Pharmacy Med Name: SILDENAFIL 100 MG TABLET] 45 tablet 1     Sig: TAKE 1 TABLET BY MOUTH AS NEEDED FOR ERECTILE DYSFUNCTION

## 2025-08-24 DIAGNOSIS — N52.9 ERECTILE DYSFUNCTION, UNSPECIFIED ERECTILE DYSFUNCTION TYPE: ICD-10-CM

## 2025-08-25 RX ORDER — SILDENAFIL 100 MG/1
TABLET, FILM COATED ORAL
Qty: 45 TABLET | Refills: 0 | Status: SHIPPED | OUTPATIENT
Start: 2025-08-25